# Patient Record
Sex: FEMALE | Race: OTHER | HISPANIC OR LATINO | URBAN - METROPOLITAN AREA
[De-identification: names, ages, dates, MRNs, and addresses within clinical notes are randomized per-mention and may not be internally consistent; named-entity substitution may affect disease eponyms.]

---

## 2018-08-20 ENCOUNTER — INPATIENT (INPATIENT)
Facility: HOSPITAL | Age: 63
LOS: 0 days | Discharge: LEFT BEFORE DISCHARGE | End: 2018-08-21
Attending: INTERNAL MEDICINE | Admitting: INTERNAL MEDICINE
Payer: COMMERCIAL

## 2018-08-20 VITALS
OXYGEN SATURATION: 99 % | TEMPERATURE: 98 F | WEIGHT: 100.09 LBS | SYSTOLIC BLOOD PRESSURE: 129 MMHG | HEART RATE: 70 BPM | RESPIRATION RATE: 20 BRPM | DIASTOLIC BLOOD PRESSURE: 81 MMHG

## 2018-08-20 DIAGNOSIS — I63.9 CEREBRAL INFARCTION, UNSPECIFIED: ICD-10-CM

## 2018-08-20 LAB
ALBUMIN SERPL ELPH-MCNC: 3.7 G/DL — SIGNIFICANT CHANGE UP (ref 3.3–5)
ALP SERPL-CCNC: 61 U/L — SIGNIFICANT CHANGE UP (ref 40–120)
ALT FLD-CCNC: 15 U/L — SIGNIFICANT CHANGE UP (ref 12–78)
AMPHET UR-MCNC: NEGATIVE — SIGNIFICANT CHANGE UP
ANION GAP SERPL CALC-SCNC: 10 MMOL/L — SIGNIFICANT CHANGE UP (ref 5–17)
APPEARANCE UR: CLEAR — SIGNIFICANT CHANGE UP
APTT BLD: 32.7 SEC — SIGNIFICANT CHANGE UP (ref 27.5–37.4)
AST SERPL-CCNC: 12 U/L — LOW (ref 15–37)
BARBITURATES UR SCN-MCNC: NEGATIVE — SIGNIFICANT CHANGE UP
BASOPHILS # BLD AUTO: 0.07 K/UL — SIGNIFICANT CHANGE UP (ref 0–0.2)
BASOPHILS NFR BLD AUTO: 0.7 % — SIGNIFICANT CHANGE UP (ref 0–2)
BENZODIAZ UR-MCNC: NEGATIVE — SIGNIFICANT CHANGE UP
BILIRUB SERPL-MCNC: 0.2 MG/DL — SIGNIFICANT CHANGE UP (ref 0.2–1.2)
BILIRUB UR-MCNC: NEGATIVE — SIGNIFICANT CHANGE UP
BUN SERPL-MCNC: 10 MG/DL — SIGNIFICANT CHANGE UP (ref 7–23)
CALCIUM SERPL-MCNC: 9.1 MG/DL — SIGNIFICANT CHANGE UP (ref 8.5–10.1)
CHLORIDE SERPL-SCNC: 111 MMOL/L — HIGH (ref 96–108)
CK MB CFR SERPL CALC: <1 NG/ML — SIGNIFICANT CHANGE UP (ref 0.5–3.6)
CO2 SERPL-SCNC: 23 MMOL/L — SIGNIFICANT CHANGE UP (ref 22–31)
COCAINE METAB.OTHER UR-MCNC: NEGATIVE — SIGNIFICANT CHANGE UP
COLOR SPEC: YELLOW — SIGNIFICANT CHANGE UP
CREAT SERPL-MCNC: 0.76 MG/DL — SIGNIFICANT CHANGE UP (ref 0.5–1.3)
DIFF PNL FLD: ABNORMAL
EOSINOPHIL # BLD AUTO: 0.19 K/UL — SIGNIFICANT CHANGE UP (ref 0–0.5)
EOSINOPHIL NFR BLD AUTO: 2 % — SIGNIFICANT CHANGE UP (ref 0–6)
EPI CELLS # UR: SIGNIFICANT CHANGE UP
ETHANOL SERPL-MCNC: <10 MG/DL — SIGNIFICANT CHANGE UP (ref 0–10)
GLUCOSE BLDC GLUCOMTR-MCNC: 136 MG/DL — HIGH (ref 70–99)
GLUCOSE SERPL-MCNC: 111 MG/DL — HIGH (ref 70–99)
GLUCOSE UR QL: NEGATIVE MG/DL — SIGNIFICANT CHANGE UP
HCT VFR BLD CALC: 39.9 % — SIGNIFICANT CHANGE UP (ref 34.5–45)
HGB BLD-MCNC: 12.9 G/DL — SIGNIFICANT CHANGE UP (ref 11.5–15.5)
IMM GRANULOCYTES NFR BLD AUTO: 0.3 % — SIGNIFICANT CHANGE UP (ref 0–1.5)
INR BLD: 0.96 RATIO — SIGNIFICANT CHANGE UP (ref 0.88–1.16)
KETONES UR-MCNC: NEGATIVE — SIGNIFICANT CHANGE UP
LEUKOCYTE ESTERASE UR-ACNC: NEGATIVE — SIGNIFICANT CHANGE UP
LYMPHOCYTES # BLD AUTO: 3.21 K/UL — SIGNIFICANT CHANGE UP (ref 1–3.3)
LYMPHOCYTES # BLD AUTO: 34.1 % — SIGNIFICANT CHANGE UP (ref 13–44)
MAGNESIUM SERPL-MCNC: 2.1 MG/DL — SIGNIFICANT CHANGE UP (ref 1.6–2.6)
MCHC RBC-ENTMCNC: 27.3 PG — SIGNIFICANT CHANGE UP (ref 27–34)
MCHC RBC-ENTMCNC: 32.3 GM/DL — SIGNIFICANT CHANGE UP (ref 32–36)
MCV RBC AUTO: 84.4 FL — SIGNIFICANT CHANGE UP (ref 80–100)
METHADONE UR-MCNC: NEGATIVE — SIGNIFICANT CHANGE UP
MONOCYTES # BLD AUTO: 0.51 K/UL — SIGNIFICANT CHANGE UP (ref 0–0.9)
MONOCYTES NFR BLD AUTO: 5.4 % — SIGNIFICANT CHANGE UP (ref 2–14)
NEUTROPHILS # BLD AUTO: 5.41 K/UL — SIGNIFICANT CHANGE UP (ref 1.8–7.4)
NEUTROPHILS NFR BLD AUTO: 57.5 % — SIGNIFICANT CHANGE UP (ref 43–77)
NITRITE UR-MCNC: NEGATIVE — SIGNIFICANT CHANGE UP
NRBC # BLD: 0 /100 WBCS — SIGNIFICANT CHANGE UP (ref 0–0)
OPIATES UR-MCNC: NEGATIVE — SIGNIFICANT CHANGE UP
PCP SPEC-MCNC: SIGNIFICANT CHANGE UP
PCP UR-MCNC: NEGATIVE — SIGNIFICANT CHANGE UP
PH UR: 5 — SIGNIFICANT CHANGE UP (ref 5–8)
PLATELET # BLD AUTO: 271 K/UL — SIGNIFICANT CHANGE UP (ref 150–400)
POTASSIUM SERPL-MCNC: 3.9 MMOL/L — SIGNIFICANT CHANGE UP (ref 3.5–5.3)
POTASSIUM SERPL-SCNC: 3.9 MMOL/L — SIGNIFICANT CHANGE UP (ref 3.5–5.3)
PROT SERPL-MCNC: 7 GM/DL — SIGNIFICANT CHANGE UP (ref 6–8.3)
PROT UR-MCNC: NEGATIVE MG/DL — SIGNIFICANT CHANGE UP
PROTHROM AB SERPL-ACNC: 10.5 SEC — SIGNIFICANT CHANGE UP (ref 9.8–12.7)
RBC # BLD: 4.73 M/UL — SIGNIFICANT CHANGE UP (ref 3.8–5.2)
RBC # FLD: 13.6 % — SIGNIFICANT CHANGE UP (ref 10.3–14.5)
RBC CASTS # UR COMP ASSIST: SIGNIFICANT CHANGE UP /HPF (ref 0–4)
SODIUM SERPL-SCNC: 144 MMOL/L — SIGNIFICANT CHANGE UP (ref 135–145)
SP GR SPEC: 1.01 — SIGNIFICANT CHANGE UP (ref 1.01–1.02)
THC UR QL: NEGATIVE — SIGNIFICANT CHANGE UP
TROPONIN I SERPL-MCNC: <.015 NG/ML — SIGNIFICANT CHANGE UP (ref 0.01–0.04)
UROBILINOGEN FLD QL: NEGATIVE MG/DL — SIGNIFICANT CHANGE UP
WBC # BLD: 9.42 K/UL — SIGNIFICANT CHANGE UP (ref 3.8–10.5)
WBC # FLD AUTO: 9.42 K/UL — SIGNIFICANT CHANGE UP (ref 3.8–10.5)

## 2018-08-20 PROCEDURE — 99223 1ST HOSP IP/OBS HIGH 75: CPT

## 2018-08-20 PROCEDURE — 70450 CT HEAD/BRAIN W/O DYE: CPT | Mod: 26,77

## 2018-08-20 PROCEDURE — 71045 X-RAY EXAM CHEST 1 VIEW: CPT | Mod: 26

## 2018-08-20 PROCEDURE — 70450 CT HEAD/BRAIN W/O DYE: CPT | Mod: 26,59

## 2018-08-20 PROCEDURE — 99291 CRITICAL CARE FIRST HOUR: CPT

## 2018-08-20 PROCEDURE — 70496 CT ANGIOGRAPHY HEAD: CPT | Mod: 26

## 2018-08-20 RX ORDER — LEVETIRACETAM 250 MG/1
1000 TABLET, FILM COATED ORAL ONCE
Qty: 0 | Refills: 0 | Status: COMPLETED | OUTPATIENT
Start: 2018-08-20 | End: 2018-08-20

## 2018-08-20 RX ORDER — SODIUM CHLORIDE 9 MG/ML
1000 INJECTION, SOLUTION INTRAVENOUS
Qty: 0 | Refills: 0 | Status: DISCONTINUED | OUTPATIENT
Start: 2018-08-20 | End: 2018-08-21

## 2018-08-20 RX ORDER — LEVETIRACETAM 250 MG/1
1500 TABLET, FILM COATED ORAL ONCE
Qty: 0 | Refills: 0 | Status: DISCONTINUED | OUTPATIENT
Start: 2018-08-20 | End: 2018-08-20

## 2018-08-20 RX ORDER — ACETAMINOPHEN 500 MG
650 TABLET ORAL EVERY 6 HOURS
Qty: 0 | Refills: 0 | Status: DISCONTINUED | OUTPATIENT
Start: 2018-08-20 | End: 2018-08-21

## 2018-08-20 RX ORDER — ALTEPLASE 100 MG
4.1 KIT INTRAVENOUS ONCE
Qty: 0 | Refills: 0 | Status: COMPLETED | OUTPATIENT
Start: 2018-08-20 | End: 2018-08-20

## 2018-08-20 RX ORDER — ALTEPLASE 100 MG
37 KIT INTRAVENOUS ONCE
Qty: 0 | Refills: 0 | Status: COMPLETED | OUTPATIENT
Start: 2018-08-20 | End: 2018-08-20

## 2018-08-20 RX ORDER — SODIUM CHLORIDE 9 MG/ML
1000 INJECTION, SOLUTION INTRAVENOUS ONCE
Qty: 0 | Refills: 0 | Status: COMPLETED | OUTPATIENT
Start: 2018-08-20 | End: 2018-08-20

## 2018-08-20 RX ADMIN — LEVETIRACETAM 400 MILLIGRAM(S): 250 TABLET, FILM COATED ORAL at 19:19

## 2018-08-20 RX ADMIN — Medication 1 MILLIGRAM(S): at 17:02

## 2018-08-20 RX ADMIN — ALTEPLASE 37 MILLIGRAM(S): KIT at 12:02

## 2018-08-20 RX ADMIN — Medication 650 MILLIGRAM(S): at 22:24

## 2018-08-20 RX ADMIN — SODIUM CHLORIDE 75 MILLILITER(S): 9 INJECTION, SOLUTION INTRAVENOUS at 19:44

## 2018-08-20 RX ADMIN — ALTEPLASE 246 MILLIGRAM(S): KIT at 12:00

## 2018-08-20 RX ADMIN — Medication 650 MILLIGRAM(S): at 21:27

## 2018-08-20 NOTE — H&P ADULT - NSHPLABSRESULTS_GEN_ALL_CORE
Lab Results:  CBC  CBC Full  -  ( 20 Aug 2018 12:19 )  WBC Count : 9.42 K/uL  Hemoglobin : 12.9 g/dL  Hematocrit : 39.9 %  Platelet Count - Automated : 271 K/uL  Mean Cell Volume : 84.4 fl  Mean Cell Hemoglobin : 27.3 pg  Mean Cell Hemoglobin Concentration : 32.3 gm/dL  Auto Neutrophil # : 5.41 K/uL  Auto Lymphocyte # : 3.21 K/uL  Auto Monocyte # : 0.51 K/uL  Auto Eosinophil # : 0.19 K/uL  Auto Basophil # : 0.07 K/uL  Auto Neutrophil % : 57.5 %  Auto Lymphocyte % : 34.1 %  Auto Monocyte % : 5.4 %  Auto Eosinophil % : 2.0 %  Auto Basophil % : 0.7 %    .		Differential:	[] Automated		[] Manual  Chemistry                        12.9   9.42  )-----------( 271      ( 20 Aug 2018 12:19 )             39.9     08-20    144  |  111<H>  |  10  ----------------------------<  111<H>  3.9   |  23  |  0.76    Ca    9.1      20 Aug 2018 12:19  Mg     2.1     08-20    TPro  7.0  /  Alb  3.7  /  TBili  0.2  /  DBili  x   /  AST  12<L>  /  ALT  15  /  AlkPhos  61  08-20    LIVER FUNCTIONS - ( 20 Aug 2018 12:19 )  Alb: 3.7 g/dL / Pro: 7.0 gm/dL / ALK PHOS: 61 U/L / ALT: 15 U/L / AST: 12 U/L / GGT: x           PT/INR - ( 20 Aug 2018 12:19 )   PT: 10.5 sec;   INR: 0.96 ratio         PTT - ( 20 Aug 2018 12:19 )  PTT:32.7 sec  Urinalysis Basic - ( 20 Aug 2018 15:56 )    Color: Yellow / Appearance: Clear / S.010 / pH: x  Gluc: x / Ketone: Negative  / Bili: Negative / Urobili: Negative mg/dL   Blood: x / Protein: Negative mg/dL / Nitrite: Negative   Leuk Esterase: Negative / RBC: 0-2 /HPF / WBC x   Sq Epi: x / Non Sq Epi: Occasional / Bacteria: x            MICROBIOLOGY/CULTURES:      RADIOLOGY RESULTS:      < from: CT Head No Cont (18 @ 16:50) >      Unremarkable study without intracranial hemorrhage, mass effect, or CT   evidence of acute territorial infarct.    < end of copied text >        < from: CT Angio Head w/ IV Cont (18 @ 12:59) >    1. Widely patent intracerebral vasculature without evidence of a proximal   stenosis orocclusion. No aneurysm identified.  2. There is segmental narrowing and tapering of multiple distal vessels   with irregularity raising the possibility of vasculitis and/or   angiopathy.. Further workup and assessment recommended.      < end of copied text >

## 2018-08-20 NOTE — H&P ADULT - NSHPPHYSICALEXAM_GEN_ALL_CORE
PHYSICAL EXAM:    GENERAL: NAD, well-groomed, well-developed  HEAD:  Atraumatic, Normocephalic  EYES: conjunctiva and sclera clear  NECK: Supple,   NERVOUS SYSTEM:  Alert & Oriented X3, Good concentration;  unable to move left side. able to wiggle toes minimally and barely moving fingers   CHEST/LUNG: Clear to percussion bilaterally;   HEART: Regular rate and rhythm;  ABDOMEN: Soft, Nontender, Nondistended;  EXTREMITIES:  No clubbing, cyanosis, or edema

## 2018-08-20 NOTE — ED PROVIDER NOTE - PHYSICAL EXAMINATION
Gen: Alert, anxious   Head: NC, AT   Eyes: PERRL, EOMI, normal lids/conjunctiva  ENT: normal hearing, patent oropharynx without erythema/exudate, uvula midline  Neck: supple, no tenderness, Trachea midline  Pulm: Bilateral BS, normal resp effort, no wheeze/stridor/retractions  CV: RRR, no M/R/G, 2+ radial and dp pulses bl, no edema  Abd: soft, NT/ND, +BS, no hepatosplenomegaly  Mskel: extremities x4 with normal ROM and no joint effusions. no ctl spine ttp.   Skin: no rash, no bruising   Neuro: AAOx3, mild sensory deficits left upper and lower ext with hemiparesis on that side. right sided wnl. left sided facial droop with slight decrease of left facial sensation. no coordination deficits or dwayne neglect

## 2018-08-20 NOTE — ED ADULT NURSE NOTE - CHIEF COMPLAINT QUOTE
pt complaining of left sided weakness, numbness and tingling as well as slurred speech starting 40 minutes ago. fs 136 at triage. code stroke called.

## 2018-08-20 NOTE — ED PROVIDER NOTE - OBJECTIVE STATEMENT
Pertinent PMH/PSH/FHx/SHx and Review of Systems contained within:  63F former smoker on nicotine pw weakness. patient was at work when approx 10am (her boss came to her) she noted she was having slurred speech and difficulty moving her left side. it started in her left upper ext but then spread to the left lower ext and face. no cp, sob, nausea, vomiting, ha, vision change, bleeding, rash, dysuria or rhinorrhea. patient did not take anything for her symptoms but called 911  Fh and Sh not otherwise contributory  ROS otherwise negative

## 2018-08-20 NOTE — H&P ADULT - PROBLEM SELECTOR PLAN 1
close monitoring in icu for 24 hours post tpa to monitor for bleeding.  neuro checks every 1 hour. PT/OT/SLP evals, check lipid and  hgb a1c in AM. carotid dopplers and echo ordered. Neuro consult pending. ASA and heparin sc when appropriate.  will start statin when able to take PO.  SCD for dvt prophylaxis.

## 2018-08-20 NOTE — H&P ADULT - ASSESSMENT
62 yo female with no PMH except being an ex-smoker on nicotine patch admitted for acute left sided weakness found to have a cva s/p tpa

## 2018-08-20 NOTE — ED ADULT NURSE NOTE - NS TRANSFER PATIENT BELONGINGS
Jewelry/Money (specify)/Cell Phone/PDA (specify)/Clothing/1 gold necklace with cross pendant. 1 pair of libia earrings and I plastic bracelet.

## 2018-08-20 NOTE — ED PROVIDER NOTE - MEDICAL DECISION MAKING DETAILS
patient pw stroke. given onset within 3 hours and clear measurable deficits with NIH 9, have given TPA quickly. [place] [facility] [place] [place]. Will do CTA. Will need ICU admit. As interpreted by ED physician, ECG is NSR with normal intervals/axis, no changes in QRS, no ST/T changes.

## 2018-08-20 NOTE — ED ADULT NURSE NOTE - OBJECTIVE STATEMENT
pt states " around 10 am on the job this morning and I felt the left side of my arm, left leg get numb and my foot fell asleep with pins and needle."

## 2018-08-20 NOTE — H&P ADULT - HISTORY OF PRESENT ILLNESS
62 yo female with no PMH except being an ex-smoker on nicotine patch admitted for acute left sided weakness that started this morning while at work.  Pt states she just didn't feel well and then started experiencing left sided upper and lower extremity tingling and numbness.  Pt also states having some dizziness with nausea and then just had a bad feeling.  Pt told co-workers and EMS called and brought pt in 62 yo female with no PMH except being an ex-smoker on nicotine patch admitted for acute left sided weakness that started this morning while at work.  Pt states she just didn't feel well and then started experiencing left sided upper and lower extremity tingling and numbness.  Pt also states having some dizziness with nausea and then just had a bad feeling.  Pt told co-workers and EMS called and brought pt in to ER and CT  head negative.  Decision was made for TPA.  ICU called for evaluation and admitted to our service.  While awaiting bed, pt had mild seizure activity with facial twitching.  Pt given ativan 1mg and keppra load of 1500mg.  Repeat head CT negative for acute pathology

## 2018-08-20 NOTE — CONSULT NOTE ADULT - ASSESSMENT
Subjective Complaints:  Historian:       Consult requested by ER doctor:                  Attending:     HPI:  62 yo female with no PMH except being an ex-smoker on nicotine patch admitted for acute left sided weakness that started this morning while at work.  Pt states she just didn't feel well and then started experiencing left sided upper and lower extremity tingling and numbness.  Pt also states having some dizziness with nausea and then just had a bad feeling.  Pt told co-workers and EMS called and brought pt in to ER and CT  head negative.  Decision was made for TPA.  ICU called for evaluation and admitted to our service.  While awaiting bed, pt had mild seizure activity with facial twitching.  Pt given ativan 1mg and keppra load of 1500mg.  Repeat head CT negative for acute pathology (20 Aug 2018 18:31)    RE LOYA    PAST MEDICAL & SURGICAL HISTORY:  No pertinent past medical history  63yFemale    MEDICATIONS  (STANDING):  lactated ringers. 1000 milliLiter(s) (75 mL/Hr) IV Continuous <Continuous>    MEDICATIONS  (PRN):  acetaminophen   Tablet. 650 milliGRAM(s) Oral every 6 hours PRN Moderate Pain (4 - 6)      Allergies    No Known Allergies    Intolerances      FAMILY HISTORY:    Vital Signs Last 24 Hrs  T(C): 36.1 (20 Aug 2018 19:08), Max: 36.8 (20 Aug 2018 15:59)  T(F): 97 (20 Aug 2018 19:08), Max: 98.2 (20 Aug 2018 15:59)  HR: 67 (20 Aug 2018 21:00) (63 - 83)  BP: 115/68 (20 Aug 2018 21:00) (94/58 - 129/81)  BP(mean): 82 (20 Aug 2018 21:00) (69 - 84)  RR: 14 (20 Aug 2018 21:00) (8 - 118)  SpO2: 100% (20 Aug 2018 21:00) (97% - 100%)    NEUROLOGICAL EXAM:  HENT:  Normocephalic head; atraumatic head.  Neck supple.  ENT: normal looking.  Mental State:    Alert.  Fully oriented to person, place and date.  Coherent.  Speech clear and intact.  Cooperative.  Responds appropriately.    Cranial Nerves:  II-XII:   Pupils round and reactive to light and accommodation.  Extraocular movements full.  Visual fields full (no homonymous hemianopsia).  Visual acuity wnl.  Facial symmetry intact.  Tongue midline.  Motor Functions:  Moves all extremities.  No pronator drift of UE.  Claps hand well.  Hand  intact bilaterally.  Ambulatory.    Sensory Functions:   Intact to touch and pinprick to face and extremities.    Reflexes:  Deep tendon reflexes normoactive to biceps, knees and ankles.  Babinski absent (present).  Cerebellar Testing:    Finger to nose intact.  Nystagmus absent.  Neurovascular: Carotid auscultation full without bruits.      LABS:                        12.9   9.42  )-----------( 271      ( 20 Aug 2018 12:19 )             39.9         144  |  111<H>  |  10  ----------------------------<  111<H>  3.9   |  23  |  0.76    Ca    9.1      20 Aug 2018 12:19  Mg     2.1         TPro  7.0  /  Alb  3.7  /  TBili  0.2  /  DBili  x   /  AST  12<L>  /  ALT  15  /  AlkPhos  61      PT/INR - ( 20 Aug 2018 12:19 )   PT: 10.5 sec;   INR: 0.96 ratio         PTT - ( 20 Aug 2018 12:19 )  PTT:32.7 sec    Urinalysis Basic - ( 20 Aug 2018 15:56 )    Color: Yellow / Appearance: Clear / S.010 / pH: x  Gluc: x / Ketone: Negative  / Bili: Negative / Urobili: Negative mg/dL   Blood: x / Protein: Negative mg/dL / Nitrite: Negative   Leuk Esterase: Negative / RBC: 0-2 /HPF / WBC x   Sq Epi: x / Non Sq Epi: Occasional / Bacteria: x        RADIOLOGY & ADDITIONAL STUDIES:    CT Brain Stroke Protocol: Urgent   Indication: left side weakness  Transport: [facility]  Exam Completed  Provider's Contact #: [phone] ( @ 11:26)  POCT  Blood Glucose: 11:25 ( @ 11:27)  Complete Blood Count + Automated Diff: STAT ( @ 11:27)  Comprehensive Metabolic Panel: STAT ( @ 11:27)  Type + Screen: STAT  Cancel Reason: Hemolyzed Redraw ( @ 11:27)  ABO Rh Confirmatory Specimen: STAT  Addl Info: Conditional: ABO Rh Confirmatory Specimen ()  CKMB Mass Assay: STAT ()  Troponin I, Serum: STAT ()  Magnesium, Serum: STAT ()  Alcohol, Blood: STAT ()  Culture - Urine: Routine  Specimen Source: Clean Catch (Midstream) ()  Drug Screen W/PCP, Urine: STAT ()  Urinalysis: STAT ()  Prothrombin Time and INR, Plasma:  Start Date:20-Aug-2018. STAT ()  Activated Partial Thromboplastin Time:  Start Date:20-Aug-2018. STAT ()  12 Lead ECG:   Provider's Contact #: [phone] (:)  Xray Chest 1 View AP/PA.: Urgent   Indication: stroke  Transport: [facility]  Exam Completed  Provider's Contact #: [phone] (:)  National Institutes of Health Stroke Scale Score:     Time/Priority:  Routine (:)  Vital Signs:     Frequency:  See Frequency Below    Every: 15 minute(s)   For: 2 hour(s)    Every: 30 minute(s)   For: 6 hour(s)    Every: 1 hour(s)   For: 16 hour(s) then every 2 hr (:)  Assess Neurological Status-Post Procedure:     Frequency:  See Frequency Below    Every: 15 minute(s)   For: 2 hour(s)    Every: 30 minute(s)   For: 6 hour(s)    Every: 1 hour(s)   For: 16 hour(s) then every 2 hr    Additional Instructions:  After start of intravenous tissue plasminogen activator (tPA) ()  Monitor For:     Additional Instructions:  Hematoma formation when using automatic blood pressure monitoring and pneumatic compression stockings. ()  Assess Bleeding ()  Dysphagia Screening:     Time/Priority:  Routine ()  Notify Provider For:     Additional Instructions:  Systolic blood pressure GREATER THAN 180 mmHg ()  Notify Provider For:     Additional Instructions:  Diastolic blood pressure GREATER THAN 105 mmHg (:)  Notify Provider For:     Additional Instructions:  Neurological decline or if patient complains of headache and STOP tissue plasminogen activator (tPA) Infusion ()  Activity - Bedrest:     Duration:  24 Hours    Additional Instructions:  Bedrest for first 24 hours after intravenous tissue plasminogen activator (tPA) (:)  Activity - Out of Bed with Assistance:     Additional Instructions:  Start 24 hours post tissue plasminogen activator (tPA) bolus (:)  NO Blood Draw: ;  Duration:  4 Hours    Additional Instructions:  After intravenous tissue plasminogen activator (tPA) infusion (:)  NO Lines: ;  Duration:  24 Hours    Additional Instructions:  No central venous or arterial lines during the first 24 hours after intravenous tissue plasminogen activator (tPA) infusion ()  Cardiac Monitor INCLUDING Off Unit Tests:     Time/Priority:  Routine (:)  Pulse Oximetry:   Frequency: <Continuous> (:)  NO Urethral Catheter: ;  Duration:  3 Hours    Additional Instructions:  During intravenous tissue plasminogen activator (tPA) infusion and for at least 2 hours after the infusion. (:)  NO Nasogastric Tube: ;  Duration:  24 Hours    Additional Instructions:  After intravenous tissue plasminogen activator (tPA) infusion. ()  Diet, NPO:      Special Instructions for Nursing:  For the first 12 hours post tissue plasminogen activator (tPA) bolus (:)  Provider to RN:       No antiplatelet or anticoagulant medications for 24 hours AFTER tissue plasminogen activator (tPA) (:)  alteplase    Bolus: [Known as ACTIVASE Bolus]  4.1 milliGRAM(s) in sterile water 4.1 milliLiter(s), IV Bolus, once, infuse over 1 Minute(s), Stop After 1 Doses  Special Instructions: Total MAX dose 90 milliGRAM(s)  Provider's Contact #: [phone]     (Calc Info: 0.09 milliGRAM(s)/Kg/DOSE x 45.4 Kg = 4.1 milliGRAM(s)/Dose     (Requested dose was 0.09 milliGRAM(s) per Kg) (:29)  alteplase    IVPB: [Known as ACTIVASE IVPB]  37 milliGRAM(s) in sterile water 37 milliLiter(s), IV Intermittent, once, infuse over 60 Minute(s), Stop After 1 Doses  Special Instructions: Total MAX dose 90 milliGRAM(s)  Provider's Contact #: [phone]     (Calc Info: 0.81 milliGRAM(s)/Kg/DOSE x 45.4 Kg = 37 milliGRAM(s)/Dose     (Requested dose was 0.81 milliGRAM(s) per Kg) ( @ 11:29)  (Floorstock):   Qty Removed: 1 each ( @ 11:32)  CT Angio Head w/ IV Cont: Urgent   Indication: cva, left side weak  Transport: [facility]  Exam Completed  Provider's Contact #: [phone] ( @ 11:56)  Nucleated RBC: 11:50 ( @ 12:19)  Antibody Screen: 11:50  Cancel Reason: Hemolyzed Redraw ( @ 12:19)  Admit to Inpatient Level of Care:     Service:  INTENSIVE CARE UNIT    Physician:  Braeden William    Additional Instructions:  Diagnosis: Ischemic stroke  Isolation: None  Special Consideration: None ( @ 13:28)  Admit from ED ( @ 14:15)  Admit to Inpatient Level of Care:     Service:  icu    Physician:  Dr. Brooks ( @ 14:47)  Provider to RN:       Sedation awakening trial as per ABCDEF Guidelines ( 14:47)  Assess For:     Additional Instructions:  CAM ICU Assessment.  As per ICU policy or at a minimum every shift ( @ 14:47)  Height/Length:     Frequency:  on admission ( @ 14:47)  Weight:     Frequency:  daily ( @ :47)  Vital Signs:     Frequency:  every 1 hour    Additional Instructions:  Assess BP, HR, RR, Temp. and SpO2 (:47)  Assess Pain:     Frequency:  every 4 hours    Additional Instructions:  As per ABCDEF guideline (:47)  Assess Neurological Status:     Type of Neuro Check:  General    Frequency:  every 4 hours ( @ 14:47)  Intake and Output - Strict:     Frequency:  every 1 hour (08-20 @ 14:47)  Notify Provider For:     Additional Instructions:  Decline or change in neurological status ( 14:47)  Activity - Increase As Tolerated:     Time/Priority:  Routine ( 14:47)  VTE Prophylaxis - No Pharmacological Prophylaxis Due To::     Time/Priority:  Routine    Reason For No Pharmacologic VTE Prophylaxis  Bleeding Risk    Additional Instructions:  tpa ( @ 14:47)  Benzodiazepine, Urine: 15:50 ( @ 15:56)  THC, Urine Qualitative: 15:50 ( @ 15:56)  Cocaine Metabolite, Urine: 15:50 (20 @ 15:56)  Amphetamine, Urine: 15:50 ( @ 15:56)  Opiate, Urine: 15:50 ( @ 15:56)  Barbiturates Screen, Urine: 15:50 ( @ 15:56)  Phencyclidine Level, Urine: 15:50 ( @ 15:56)  Methadone, Urine: 15:50 ( @ 15:56)  Urine Microscopic-Add On (NC): 15:50 ( @ 15:59)  CT Head No Cont: Routine   Indication: seizure  Transport: [facility]  Exam Completed  Provider's Contact #: [phone] ( @ 16:22)  LORazepam   Injectable: [Ordered as ATIVAN Injectable]  1 milliGRAM(s), IV Push, once, Stop After 1 Doses  Administration Instructions: This is a Look-alike/Sound-alike Medication  Provider's Contact #: [phone] ( @ 16:22)  levETIRAcetam  IVPB: [Ordered as KEPPRA IVPB]  1500 milliGRAM(s) in IV Solution 100 milliLiter(s), IV Intermittent, once, infuse over 15 Minute(s), Stop After 1 Doses  Administration Instructions: This is a Look-alike/Sound-alike Medication  Provider's Contact #: [phone] ( 16:22)  (ADM OVERRIDE):   Qty Removed: 1 each  Route - Dose Given <see task> ( @ 16:23)  (ADM OVERRIDE):   Qty Removed: 1 each  Route - Dose Given <see task> ( @ 16:27)  lactated ringers Bolus:   1000 milliLiter(s), IV Bolus, once, infuse over 30 Minute(s), Stop After 1 Doses  Provider's Contact #: [phone] ( @ 17:07)  TTE Echo Doppler w/o Cont:   Transport: Portable  Monitor: w/ Monitor  Provider's Contact #: [phone] ( @ 18:02)  US Duplex Carotid Arteries Complete, Bilateral: Routine   Indication: stenosis  Transport: [facility]  Provider's Contact #: [phone] ( @ 18:02)  Lipid Profile: AM Sched. Collection: 21-Aug-2018 04:00 ( @ 18:02)  Hemoglobin A1C, Whole Blood: AM Sched. Collection: 21-Aug-2018 04:00 ( @ 18:02)  Consult- PT Evaluate and Treat:   *Reason for Consult - Must select at least one choice*     Neuro Event  Weight Bearing Restrictions: No ( @ 18:02)  Consult- OT Evaluate and Treat:   *Reason for Consult - Must select at least one choice*     ADL  Weight Bearing Restrictions: No ( @ 18:02)  Consult- Speech Bedside Swallow Evaluation:   *Reason for Consult - Must select at least one choice*     NPO Until Further Recommendations Made ( @ 18:02)  Assess Neurological Status:     Type of Neuro Check:  Stroke    Frequency:  every 1 hour ( @ 18:02)  MR Head No Cont: Routine   Indication: cva  Transport: [facility],  w/ Monitor  Provider's Contact #: [phone] ( @ 18:04)  Complete Blood Count: AM Sched. Collection: 21-Aug-2018 04:00 ( @ 18:07)  Comprehensive Metabolic Panel: AM Sched. Collection: 21-Aug-2018 04:00 ( @ 18:07)  Magnesium, Serum: AM Sched. Collection: 21-Aug-2018 04:00 ( @ 18:07)  Phosphorus Level, Serum: AM Sched. Collection: 21-Aug-2018 04:00 (08 @ 18:07)  Compression Device Sequential:     Body Side:  Bilateral ( @ 18:08)  levETIRAcetam  IVPB: [Ordered as KEPPRA IVPB]  1000 milliGRAM(s) in IV Solution 100 milliLiter(s), IV Intermittent, once, infuse over 15 Minute(s), Stop After 1 Doses  Administration Instructions: This is a Look-alike/Sound-alike Medication  Provider's Contact #: [phone] ( @ 18:11)  lactated ringers.: Solution, 1000 milliLiter(s) infuse at 75 mL/Hr  Provider's Contact #: [phone] ( @ 19:34)  acetaminophen   Tablet.: [Known as TYLENOL.]  650 milliGRAM(s), Oral, every 6 hours, PRN for Moderate Pain (4 - 6)  Administration Instructions: MAX DAILY DOSE:  ADULT = 4,000 mG/Day ( @ 21:19)      Assessment & Opinion:    Recommendations:  Brain MRI.  Carotid doppler.  Echocardiogram.  EEG.   DVT prophylaxis as ordered.  Medications: Subjective Complaints:  Historian: Patient. ER notes reviewed.  Brain CT unremarkable.   HPI:    RE LOYA.  62 yo female with no PMH except being an ex-smoker on nicotine patch admitted for acute left sided weakness that started this morning while at work.  Pt states she just didn't feel well and then started experiencing left sided upper and lower extremity tingling and numbness.  Pt also states having some dizziness with nausea and then just had a bad feeling.  Pt told co-workers and EMS called and brought pt in to ER and CT  head negative.  Decision was made for TPA.  ICU called for evaluation and admitted to our service.  While awaiting bed, pt had mild seizure activity with facial twitching.  Pt given ativan 1mg and keppra load of 1500mg.  Repeat head CT negative for acute pathology (20 Aug 2018 18:31)    PAST MEDICAL & SURGICAL HISTORY:  No pertinent past medical history    MEDICATIONS  (STANDING):  lactated ringers. 1000 milliLiter(s) (75 mL/Hr) IV Continuous <Continuous>    MEDICATIONS  (PRN):  acetaminophen   Tablet. 650 milliGRAM(s) Oral every 6 hours PRN Moderate Pain (4 - 6)  Allergies: No Known Allergies  Intolerances  FAMILY HISTORY:    Vital Signs Last 24 Hrs  T(C): 36.1 (20 Aug 2018 19:08), Max: 36.8 (20 Aug 2018 15:59)  T(F): 97 (20 Aug 2018 19:08), Max: 98.2 (20 Aug 2018 15:59)  HR: 67 (20 Aug 2018 21:00) (63 - 83)  BP: 115/68 (20 Aug 2018 21:00) (94/58 - 129/81)  BP(mean): 82 (20 Aug 2018 21:00) (69 - 84)  RR: 14 (20 Aug 2018 21:00) (8 - 118)  SpO2: 100% (20 Aug 2018 21:00) (97% - 100%)    NEUROLOGICAL EXAM:  HENT:  Normocephalic head; atraumatic head.  Neck supple.  ENT: normal looking.  Mental State:    Alert.  Fully oriented to person, place and date.  Coherent.  Speech clear and intact.  Cooperative.  Responds appropriately.    Cranial Nerves:  II-XII:   Pupils round and reactive to light and accommodation.  Extraocular movements full.  Visual fields full (no homonymous hemianopsia).  Visual acuity wnl.  Facial symmetry intact.    Motor Functions:  Moves all extremities.  No pronator drift of UE.  Claps hands well.  Hand  intact bilaterally.      Sensory Functions:   Intact to touch and pinprick to face and extremities.    Reflexes:  Deep tendon reflexes normoactive to biceps, knees and ankles.    Cerebellar Testing:    Finger to nose intact.  Nystagmus absent.  Neurovascular: Carotid auscultation full without bruits.      LABS:                        12.9   9.42  )-----------( 271      ( 20 Aug 2018 12:19 )             39.9         144  |  111<H>  |  10  ----------------------------<  111<H>  3.9   |  23  |  0.76    Ca    9.1      20 Aug 2018 12:19  Mg     2.1         TPro  7.0  /  Alb  3.7  /  TBili  0.2  /  DBili  x   /  AST  12<L>  /  ALT  15  /  AlkPhos  61      PT/INR - ( 20 Aug 2018 12:19 )   PT: 10.5 sec;   INR: 0.96 ratio       PTT - ( 20 Aug 2018 12:19 )  PTT:32.7 sec  Urinalysis Basic - ( 20 Aug 2018 15:56 )    Color: Yellow / Appearance: Clear / S.010 / pH: x  Gluc: x / Ketone: Negative  / Bili: Negative / Urobili: Negative mg/dL   Blood: x / Protein: Negative mg/dL / Nitrite: Negative   Leuk Esterase: Negative / RBC: 0-2 /HPF / WBC x   Sq Epi: x / Non Sq Epi: Occasional / Bacteria: x    RADIOLOGY & ADDITIONAL STUDIES:    CT Brain Stroke Protocol: Urgent   Indication: left side weakness  Transport: [facility]  Exam Completed  Provider's Contact #: [phone] ( @ 11:26)  POCT  Blood Glucose: 11:25 ( @ 11:27)  Complete Blood Count + Automated Diff: STAT ( @ 11:27)  Comprehensive Metabolic Panel: STAT ( @ 11:27)  Type + Screen: STAT  Cancel Reason: Hemolyzed Redraw ( @ 11:27)  ABO Rh Confirmatory Specimen: STAT  Addl Info: Conditional: ABO Rh Confirmatory Specimen ( @ 11:27)  CKMB Mass Assay: STAT (:)  Troponin I, Serum: STAT ()  Magnesium, Serum: STAT ()  Alcohol, Blood: STAT ()  Culture - Urine: Routine  Specimen Source: Clean Catch (Midstream) (:)  Drug Screen W/PCP, Urine: STAT (:)  Urinalysis: STAT ()  Prothrombin Time and INR, Plasma:  Start Date:20-Aug-2018. STAT (:)  Activated Partial Thromboplastin Time:  Start Date:20-Aug-2018. STAT (:)  12 Lead ECG:   Provider's Contact #: [phone] ()  Xray Chest 1 View AP/PA.: Urgent   Indication: stroke  Transport: Stret[facility]-Crib  Exam Completed  Provider's Contact #: [phone] (:)  National Institutes of Health Stroke Scale Score:     Time/Priority:  Routine ()  Vital Signs:     Frequency:  See Frequency Below    Every: 15 minute(s)   For: 2 hour(s)    Every: 30 minute(s)   For: 6 hour(s)    Every: 1 hour(s)   For: 16 hour(s) then every 2 hr (:)  Assess Neurological Status-Post Procedure:     Frequency:  See Frequency Below    Every: 15 minute(s)   For: 2 hour(s)    Every: 30 minute(s)   For: 6 hour(s)    Every: 1 hour(s)   For: 16 hour(s) then every 2 hr    Additional Instructions:  After start of intravenous tissue plasminogen activator (tPA) ()  Monitor For:     Additional Instructions:  Hematoma formation when using automatic blood pressure monitoring and pneumatic compression stockings. ()  Assess Bleeding ()  Dysphagia Screening:     Time/Priority:  Routine ()  Notify Provider For:     Additional Instructions:  Systolic blood pressure GREATER THAN 180 mmHg (:)  Notify Provider For:     Additional Instructions:  Diastolic blood pressure GREATER THAN 105 mmHg ()  Notify Provider For:     Additional Instructions:  Neurological decline or if patient complains of headache and STOP tissue plasminogen activator (tPA) Infusion (:)  Activity - Bedrest:     Duration:  24 Hours    Additional Instructions:  Bedrest for first 24 hours after intravenous tissue plasminogen activator (tPA) (:)  Activity - Out of Bed with Assistance:     Additional Instructions:  Start 24 hours post tissue plasminogen activator (tPA) bolus (:)  NO Blood Draw: ;  Duration:  4 Hours    Additional Instructions:  After intravenous tissue plasminogen activator (tPA) infusion (:)  NO Lines: ;  Duration:  24 Hours    Additional Instructions:  No central venous or arterial lines during the first 24 hours after intravenous tissue plasminogen activator (tPA) infusion (:)  Cardiac Monitor INCLUDING Off Unit Tests:     Time/Priority:  Routine (:)  Pulse Oximetry:   Frequency: <Continuous> (:)  NO Urethral Catheter: ;  Duration:  3 Hours    Additional Instructions:  During intravenous tissue plasminogen activator (tPA) infusion and for at least 2 hours after the infusion. (:)  NO Nasogastric Tube: ;  Duration:  24 Hours    Additional Instructions:  After intravenous tissue plasminogen activator (tPA) infusion. (:)  Diet, NPO:      Special Instructions for Nursing:  For the first 12 hours post tissue plasminogen activator (tPA) bolus (:)  Provider to RN:       No antiplatelet or anticoagulant medications for 24 hours AFTER tissue plasminogen activator (tPA) (:29)  alteplase    Bolus: [Known as ACTIVASE Bolus]  4.1 milliGRAM(s) in sterile water 4.1 milliLiter(s), IV Bolus, once, infuse over 1 Minute(s), Stop After 1 Doses  Special Instructions: Total MAX dose 90 milliGRAM(s)  Provider's Contact #: [phone]     (Calc Info: 0.09 milliGRAM(s)/Kg/DOSE x 45.4 Kg = 4.1 milliGRAM(s)/Dose     (Requested dose was 0.09 milliGRAM(s) per Kg) ( 11:29)  alteplase    IVPB: [Known as ACTIVASE IVPB]  37 milliGRAM(s) in sterile water 37 milliLiter(s), IV Intermittent, once, infuse over 60 Minute(s), Stop After 1 Doses  Special Instructions: Total MAX dose 90 milliGRAM(s)  Provider's Contact #: [phone]     (Calc Info: 0.81 milliGRAM(s)/Kg/DOSE x 45.4 Kg = 37 milliGRAM(s)/Dose     (Requested dose was 0.81 milliGRAM(s) per Kg) ( @ 11:29)  (Floorstock):   Qty Removed: 1 each ( @ 11:32)  CT Angio Head w/ IV Cont: Urgent   Indication: cva, left side weak  Transport: Stretcher-Crib  Exam Completed  Provider's Contact #: [phone] ( @ 11:56)  Nucleated RBC: 11:50 ( @ 12:19)  Antibody Screen: 11:50  Cancel Reason: Hemolyzed Redraw ( @ 12:19)  Admit to Inpatient Level of Care:     Service:  INTENSIVE CARE UNIT    Physician:  Braeden William    Additional Instructions:  Diagnosis: Ischemic stroke  Isolation: None  Special Consideration: None ( @ 13:28)  Admit from ED ( @ 14:15)  Admit to Inpatient Level of Care:     Service:  icu    Physician:  Dr. Brooks ( @ 14:47)  Provider to RN:       Sedation awakening trial as per ABCDEF Guidelines ( 14:47)  Assess For:     Additional Instructions:  CAM ICU Assessment.  As per ICU policy or at a minimum every shift (:47)  Height/Length:     Frequency:  on admission ( @ 14:47)  Weight:     Frequency:  daily ( @ :47)  Vital Signs:     Frequency:  every 1 hour    Additional Instructions:  Assess BP, HR, RR, Temp. and SpO2 ( @ 14:47)  Assess Pain:     Frequency:  every 4 hours    Additional Instructions:  As per ABCDEF guideline ( 14:47)  Assess Neurological Status:     Type of Neuro Check:  General    Frequency:  every 4 hours ( 14:47)  Intake and Output - Strict:     Frequency:  every 1 hour ( 14:47)  Notify Provider For:     Additional Instructions:  Decline or change in neurological status ( @ 14:47)  Activity - Increase As Tolerated:     Time/Priority:  Routine ( 14:47)  VTE Prophylaxis - No Pharmacological Prophylaxis Due To::     Time/Priority:  Routine    Reason For No Pharmacologic VTE Prophylaxis  Bleeding Risk    Additional Instructions:  tpa ( @ 14:47)  Benzodiazepine, Urine: 15:50 ( @ 15:56)  THC, Urine Qualitative: 15:50 ( @ 15:56)  Cocaine Metabolite, Urine: 15:50 ( @ 15:56)  Amphetamine, Urine: 15:50 (20 @ 15:56)  Opiate, Urine: 15:50 ( @ 15:56)  Barbiturates Screen, Urine: 15:50 ( @ 15:56)  Phencyclidine Level, Urine: 15:50 ( @ 15:56)  Methadone, Urine: 15:50 ( @ 15:56)  Urine Microscopic-Add On (NC): 15:50 ( @ 15:59)  CT Head No Cont: Routine   Indication: seizure  Transport: Stretcher-Crib  Exam Completed  Provider's Contact #: [phone] ( @ 16:22)  LORazepam   Injectable: [Ordered as ATIVAN Injectable]  1 milliGRAM(s), IV Push, once, Stop After 1 Doses  Administration Instructions: This is a Look-alike/Sound-alike Medication  Provider's Contact #: [phone] ( @ 16:22)  levETIRAcetam  IVPB: [Ordered as KEPPRA IVPB]  1500 milliGRAM(s) in IV Solution 100 milliLiter(s), IV Intermittent, once, infuse over 15 Minute(s), Stop After 1 Doses  Administration Instructions: This is a Look-alike/Sound-alike Medication  Provider's Contact #: [phone] ( @ 16:22)  (ADM OVERRIDE):   Qty Removed: 1 each  Route - Dose Given <see task> ( @ 16:23)  (ADM OVERRIDE):   Qty Removed: 1 each  Route - Dose Given <see task> ( @ 16:27)  lactated ringers Bolus:   1000 milliLiter(s), IV Bolus, once, infuse over 30 Minute(s), Stop After 1 Doses  Provider's Contact #: [phone] ( @ 17:07)  TTE Echo Doppler w/o Cont:   Transport: Portable  Monitor: w/ Monitor  Provider's Contact #: [phone] ( @ 18:02)  US Duplex Carotid Arteries Complete, Bilateral: Routine   Indication: stenosis  Transport: Stretcher-Crib  Provider's Contact #: [phone] ( @ 18:02)  Lipid Profile: AM Sched. Collection: 21-Aug-2018 04:00 ( @ 18:02)  Hemoglobin A1C, Whole Blood: AM Sched. Collection: 21-Aug-2018 04:00 ( @ 18:02)  Consult- PT Evaluate and Treat:   *Reason for Consult - Must select at least one choice*     Neuro Event  Weight Bearing Restrictions: No ( @ 18:02)  Consult- OT Evaluate and Treat:   *Reason for Consult - Must select at least one choice*     ADL  Weight Bearing Restrictions: No ( @ 18:02)  Consult- Speech Bedside Swallow Evaluation:   *Reason for Consult - Must select at least one choice*     NPO Until Further Recommendations Made ( @ 18:02)  Assess Neurological Status:     Type of Neuro Check:  Stroke    Frequency:  every 1 hour ( @ 18:02)  MR Head No Cont: Routine   Indication: cva  Transport: Stretcher-Crib,  w/ Monitor  Provider's Contact #: [phone] ( @ 18:04)  Complete Blood Count: AM Sched. Collection: 21-Aug-2018 04:00 ( @ 18:07)  Comprehensive Metabolic Panel: AM Sched. Collection: 21-Aug-2018 04:00 ( @ 18:07)  Magnesium, Serum: AM Sched. Collection: 21-Aug-2018 04:00 ( @ 18:07)  Phosphorus Level, Serum: AM Sched. Collection: 21-Aug-2018 04:00 (08 @ 18:07)  Compression Device Sequential:     Body Side:  Bilateral ( @ 18:08)  levETIRAcetam  IVPB: [Ordered as KEPPRA IVPB]  1000 milliGRAM(s) in IV Solution 100 milliLiter(s), IV Intermittent, once, infuse over 15 Minute(s), Stop After 1 Doses  Administration Instructions: This is a Look-alike/Sound-alike Medication  Provider's Contact #: [phone] ( @ 18:11)  lactated ringers.: Solution, 1000 milliLiter(s) infuse at 75 mL/Hr  Provider's Contact #: [phone] ( @ 19:34)  acetaminophen   Tablet.: [Known as TYLENOL.]  650 milliGRAM(s), Oral, every 6 hours, PRN for Moderate Pain (4 - 6)  Administration Instructions: MAX DAILY DOSE:  ADULT = 4,000 mG/Day ( @ 21:19)    Assessment & Opinion:  Right CVA with history of left  sided weakness.  S/P tPA administration.    Recommendations:  Brain MRI.  Carotid doppler.  Echocardiogram.  EEG.  Lipid profile.   DVT prophylaxis as ordered.  PT/OT evaluation.    Medications:  Continue all medications.  Will follow.

## 2018-08-20 NOTE — H&P ADULT - NSHPREVIEWOFSYSTEMS_GEN_ALL_CORE
REVIEW OF SYSTEMS      REVIEW OF SYSTEMS:    CONSTITUTIONAL: No fever,   EYES: No visual disturbances  ENMT:  No difficulty hearing,  NECK: No pain or stiffness  RESPIRATORY: No cough No shortness of breath  CARDIOVASCULAR: No chest pain,  GASTROINTESTINAL: + nausea, no vomiting  NEUROLOGICAL: No headaches,left sided numbness/tingling  MUSCULOSKELETAL: No muscle, back, or extremity pain

## 2018-08-20 NOTE — ED ADULT NURSE NOTE - NSIMPLEMENTINTERV_GEN_ALL_ED
Implemented All Fall Risk Interventions:  [place] to call system. Call bell, personal items and telephone within reach. Instruct patient to call for assistance. Room bathroom lighting operational. Non-slip footwear when patient is off stretcher. Physically safe environment: no spills, clutter or unnecessary equipment. Stretcher in lowest position, wheels locked, appropriate side rails in place. Provide visual cue, wrist band, yellow gown, etc. Monitor gait and stability. Monitor for mental status changes and reorient to person, place, and time. Review medications for side effects contributing to fall risk. Reinforce activity limits and safety measures with patient and family.

## 2018-08-21 VITALS
OXYGEN SATURATION: 95 % | HEART RATE: 81 BPM | DIASTOLIC BLOOD PRESSURE: 76 MMHG | RESPIRATION RATE: 19 BRPM | SYSTOLIC BLOOD PRESSURE: 157 MMHG | TEMPERATURE: 98 F

## 2018-08-21 DIAGNOSIS — F43.10 POST-TRAUMATIC STRESS DISORDER, UNSPECIFIED: ICD-10-CM

## 2018-08-21 DIAGNOSIS — F19.11 OTHER PSYCHOACTIVE SUBSTANCE ABUSE, IN REMISSION: ICD-10-CM

## 2018-08-21 DIAGNOSIS — F41.8 OTHER SPECIFIED ANXIETY DISORDERS: ICD-10-CM

## 2018-08-21 DIAGNOSIS — F34.1 DYSTHYMIC DISORDER: ICD-10-CM

## 2018-08-21 LAB
ALBUMIN SERPL ELPH-MCNC: 2.9 G/DL — LOW (ref 3.3–5)
ALP SERPL-CCNC: 52 U/L — SIGNIFICANT CHANGE UP (ref 40–120)
ALT FLD-CCNC: 11 U/L — LOW (ref 12–78)
ANION GAP SERPL CALC-SCNC: 9 MMOL/L — SIGNIFICANT CHANGE UP (ref 5–17)
AST SERPL-CCNC: 14 U/L — LOW (ref 15–37)
BILIRUB SERPL-MCNC: 0.3 MG/DL — SIGNIFICANT CHANGE UP (ref 0.2–1.2)
BUN SERPL-MCNC: 9 MG/DL — SIGNIFICANT CHANGE UP (ref 7–23)
CALCIUM SERPL-MCNC: 8.3 MG/DL — LOW (ref 8.5–10.1)
CHLORIDE SERPL-SCNC: 111 MMOL/L — HIGH (ref 96–108)
CHOLEST SERPL-MCNC: 152 MG/DL — SIGNIFICANT CHANGE UP (ref 10–199)
CO2 SERPL-SCNC: 25 MMOL/L — SIGNIFICANT CHANGE UP (ref 22–31)
CREAT SERPL-MCNC: 0.56 MG/DL — SIGNIFICANT CHANGE UP (ref 0.5–1.3)
CULTURE RESULTS: SIGNIFICANT CHANGE UP
GLUCOSE SERPL-MCNC: 80 MG/DL — SIGNIFICANT CHANGE UP (ref 70–99)
HBA1C BLD-MCNC: 5.6 % — SIGNIFICANT CHANGE UP (ref 4–5.6)
HCT VFR BLD CALC: 35.9 % — SIGNIFICANT CHANGE UP (ref 34.5–45)
HDLC SERPL-MCNC: 56 MG/DL — SIGNIFICANT CHANGE UP
HGB BLD-MCNC: 11.6 G/DL — SIGNIFICANT CHANGE UP (ref 11.5–15.5)
LIPID PNL WITH DIRECT LDL SERPL: 74 MG/DL — SIGNIFICANT CHANGE UP
MAGNESIUM SERPL-MCNC: 2.2 MG/DL — SIGNIFICANT CHANGE UP (ref 1.6–2.6)
MCHC RBC-ENTMCNC: 27.3 PG — SIGNIFICANT CHANGE UP (ref 27–34)
MCHC RBC-ENTMCNC: 32.3 GM/DL — SIGNIFICANT CHANGE UP (ref 32–36)
MCV RBC AUTO: 84.5 FL — SIGNIFICANT CHANGE UP (ref 80–100)
NRBC # BLD: 0 /100 WBCS — SIGNIFICANT CHANGE UP (ref 0–0)
PHOSPHATE SERPL-MCNC: 3.7 MG/DL — SIGNIFICANT CHANGE UP (ref 2.5–4.5)
PLATELET # BLD AUTO: 236 K/UL — SIGNIFICANT CHANGE UP (ref 150–400)
POTASSIUM SERPL-MCNC: 4.1 MMOL/L — SIGNIFICANT CHANGE UP (ref 3.5–5.3)
POTASSIUM SERPL-SCNC: 4.1 MMOL/L — SIGNIFICANT CHANGE UP (ref 3.5–5.3)
PROT SERPL-MCNC: 5.6 GM/DL — LOW (ref 6–8.3)
RBC # BLD: 4.25 M/UL — SIGNIFICANT CHANGE UP (ref 3.8–5.2)
RBC # FLD: 13.6 % — SIGNIFICANT CHANGE UP (ref 10.3–14.5)
SODIUM SERPL-SCNC: 145 MMOL/L — SIGNIFICANT CHANGE UP (ref 135–145)
SPECIMEN SOURCE: SIGNIFICANT CHANGE UP
TOTAL CHOLESTEROL/HDL RATIO MEASUREMENT: 2.7 RATIO — LOW (ref 3.3–7.1)
TRIGL SERPL-MCNC: 108 MG/DL — SIGNIFICANT CHANGE UP (ref 10–149)
WBC # BLD: 6.88 K/UL — SIGNIFICANT CHANGE UP (ref 3.8–10.5)
WBC # FLD AUTO: 6.88 K/UL — SIGNIFICANT CHANGE UP (ref 3.8–10.5)

## 2018-08-21 PROCEDURE — 70551 MRI BRAIN STEM W/O DYE: CPT | Mod: 26

## 2018-08-21 PROCEDURE — 99233 SBSQ HOSP IP/OBS HIGH 50: CPT

## 2018-08-21 PROCEDURE — 90792 PSYCH DIAG EVAL W/MED SRVCS: CPT

## 2018-08-21 RX ORDER — ATORVASTATIN CALCIUM 80 MG/1
40 TABLET, FILM COATED ORAL AT BEDTIME
Qty: 0 | Refills: 0 | Status: DISCONTINUED | OUTPATIENT
Start: 2018-08-21 | End: 2018-08-21

## 2018-08-21 RX ORDER — ASPIRIN/CALCIUM CARB/MAGNESIUM 324 MG
81 TABLET ORAL DAILY
Qty: 0 | Refills: 0 | Status: DISCONTINUED | OUTPATIENT
Start: 2018-08-22 | End: 2018-08-21

## 2018-08-21 RX ORDER — ENOXAPARIN SODIUM 100 MG/ML
40 INJECTION SUBCUTANEOUS EVERY 24 HOURS
Qty: 0 | Refills: 0 | Status: DISCONTINUED | OUTPATIENT
Start: 2018-08-21 | End: 2018-08-21

## 2018-08-21 RX ORDER — ESCITALOPRAM OXALATE 10 MG/1
5 TABLET, FILM COATED ORAL
Qty: 0 | Refills: 0 | Status: DISCONTINUED | OUTPATIENT
Start: 2018-08-21 | End: 2018-08-21

## 2018-08-21 RX ORDER — NICOTINE POLACRILEX 2 MG
1 GUM BUCCAL DAILY
Qty: 0 | Refills: 0 | Status: DISCONTINUED | OUTPATIENT
Start: 2018-08-21 | End: 2018-08-21

## 2018-08-21 RX ORDER — KETOROLAC TROMETHAMINE 30 MG/ML
15 SYRINGE (ML) INJECTION EVERY 8 HOURS
Qty: 0 | Refills: 0 | Status: DISCONTINUED | OUTPATIENT
Start: 2018-08-21 | End: 2018-08-21

## 2018-08-21 RX ORDER — ASPIRIN/CALCIUM CARB/MAGNESIUM 324 MG
81 TABLET ORAL ONCE
Qty: 0 | Refills: 0 | Status: COMPLETED | OUTPATIENT
Start: 2018-08-21 | End: 2018-08-21

## 2018-08-21 RX ADMIN — Medication 81 MILLIGRAM(S): at 16:25

## 2018-08-21 RX ADMIN — Medication 650 MILLIGRAM(S): at 10:30

## 2018-08-21 RX ADMIN — SODIUM CHLORIDE 75 MILLILITER(S): 9 INJECTION, SOLUTION INTRAVENOUS at 09:44

## 2018-08-21 RX ADMIN — Medication 650 MILLIGRAM(S): at 10:13

## 2018-08-21 RX ADMIN — ESCITALOPRAM OXALATE 5 MILLIGRAM(S): 10 TABLET, FILM COATED ORAL at 16:58

## 2018-08-21 NOTE — BEHAVIORAL HEALTH ASSESSMENT NOTE - NSBHSUICPROTECTFACT_PSY_A_CORE
Identifies reasons for living/Supportive social network or family/Future oriented/Positive therapeutic relationships/Responsibility to family and others/Engaged in work or school

## 2018-08-21 NOTE — DIETITIAN INITIAL EVALUATION ADULT. - OTHER INFO
Pt seen for ICU admission. Pt lives with daughter. Pt does cooking & food shopping PTA.  Pt reports good appetite PTA. Pt admitted with CVA with Left sided weakness & TPA given. No GI distress. pt reports last BM x 1( Saturday 8/18). Swallow evaluation 8/21 recommend Regular consistency/thin liquids

## 2018-08-21 NOTE — PROGRESS NOTE ADULT - ASSESSMENT
63F no PMH except being an ex-smoker on nicotine patch admitted for acute left sided weakness that started on morning of presentation while at work.  Pt works as a  at a gym. States she just didn't feel well and then started experiencing left sided upper and lower extremity tingling and numbness with subsequent left sided weakness.  Pt also states having some dizziness with nausea and then just had a "bad feeling".  Pt told co-workers and EMS called and brought pt in to ER with CT  head negative.  Decision was made for TPA.  ICU called for evaluation and admitted to our service.  While awaiting transfer to ICU, pt had mild seizure like activity with left facial twitching (however pt awake, alert, responsive throughout event).  Pt given ativan 1mg and keppra load of 1500mg in ER.  Repeat head CT negative for acute pathology. MRI without evidence of acute infarct.     1. NEURO  - pt presents with stroke like symptoms: left sided weakness and paresthesia  - given tPA in ED  - pt with occasional fleeting left sided weakness. per nursing pt when in room by self noted to be able to move left arm to adjust her pillow and blankets. Symptoms of weakness seem to worsen when family is at bedside especially with onset of seizure like activity with left facial twitching (during which pt is fully awake and oriented, twitching stops when pt answers questions or when asked to perform tasks)  - EEG performed today, follow up results  - uncertain if pt truly have focal seizure  - start ASA and statin therapy  - PT/OT  - passed swallow eval  - neurology eval    2. CV  - baseline BP 90s to low 100s per patient  - pt awake and mentating    3. PSYCH  - appreciate psych eval  - recommend Lexapro for underlying depression    4. Gen  - stable for transfer to medical floor

## 2018-08-21 NOTE — DIETITIAN INITIAL EVALUATION ADULT. - ADHERENCE
Regular diet PTA; Breakfast; Coffee & Bagel twist with chedder cheese; Lunch; skip Dinner; salad, baked chicken or salmon, fruit(cherries, peaches or grapes) & sweet potatoes, green beans/n/a
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
## 2018-08-21 NOTE — BEHAVIORAL HEALTH ASSESSMENT NOTE - RISK ASSESSMENT
has chronic risks: chronic depression-anxiety, ptsd/trauma, intermittent automatic negative thoughts gissell of si with no intent-but is alarmed about it, has insight about it, able to engage in some effective tools to cope with it- exercise, work, attend NA meetings, and is now seeking psychiatric help. Sleep can be fractured, h/o inpt psych admissions, h/o of si or sa- 17 years ago, is future oriented, engaged in work, able to state reasons to live, engages well in safety, discharge and treatment planning

## 2018-08-21 NOTE — BEHAVIORAL HEALTH ASSESSMENT NOTE - NSBHCONSULTMEDS_PSY_A_CORE FT
Start a trial of Lexapro 5mg q am po.  Will coordinate with SW to ensure patient gets appts at [facility] prior to d/c  Will give other resources to patient as well

## 2018-08-21 NOTE — DIETITIAN INITIAL EVALUATION ADULT. - PERTINENT LABORATORY DATA
08-21 Na 145 mmol/L Glu 80 mg/dL K+ 4.1 mmol/L Cr  0.56 mg/dL BUN 9 mg/dL Phos 3.7 mg/dL Alb 2.9 g/dL<L> PAB n/a   Hgb 11.6 g/dL Hct 35.9 %, Phos=3.7(8/21), EQMS=037(8/20), 08-21 Chol 152 LDL 74 HDL 56 Trig 108, YimB3P=6.6%(8/21)

## 2018-08-21 NOTE — SWALLOW BEDSIDE ASSESSMENT ADULT - COMMENTS
Xray Chest 8/20/2018 IMPRESSION: No consolidation, pleural effusion or pneumothorax. Linear atelectasis at the left base. The cardiomediastinal silhouette is normal.    CT Head 8/20/2018 IMPRESSION: IMPRESSION: Unremarkable study without intracranial hemorrhage, mass effect, or CT evidence of acute territorial infarct.

## 2018-08-21 NOTE — SWALLOW BEDSIDE ASSESSMENT ADULT - SWALLOW EVAL: DIAGNOSIS
pt presented with oropharyngeal phases of swallow within normal limits. No overt signs of aspiration noted.

## 2018-08-21 NOTE — PROGRESS NOTE ADULT - SUBJECTIVE AND OBJECTIVE BOX
HPI:  63F no PMH except being an ex-smoker on nicotine patch admitted for acute left sided weakness that started on morning of presentation while at work.  Pt works as a  at a gym. States she just didn't feel well and then started experiencing left sided upper and lower extremity tingling and numbness with subsequent left sided weakness.  Pt also states having some dizziness with nausea and then just had a "bad feeling".  Pt told co-workers and EMS called and brought pt in to ER and CT  head negative.  Decision was made for TPA.  ICU called for evaluation and admitted to our service.  While awaiting bed, pt had mild seizure activity with facial twitching.  Pt given ativan 1mg and keppra load of 1500mg.  Repeat head CT negative for acute pathology (20 Aug 2018 18:31)      24 hr events:      ## ROS:  [ ] unable to obtain  CONSTITUTIONAL: No fever, weight loss, or fatigue  EYES: No eye pain, visual disturbances, or discharge  ENMT:  No difficulty hearing, tinnitus, vertigo; No sinus or throat pain  NECK: No pain or stiffness  RESPIRATORY: No cough, wheezing, chills or hemoptysis; No shortness of breath  CARDIOVASCULAR: No chest pain, palpitations, dizziness, or leg swelling  GASTROINTESTINAL: No abdominal or epigastric pain. No nausea, vomiting, or hematemesis; No diarrhea or constipation. No melena or hematochezia.  GENITOURINARY: No dysuria, frequency, hematuria, or incontinence  NEUROLOGICAL: No headaches, memory loss, loss of strength, numbness, or tremors  SKIN: No itching, burning, rashes, or lesions   LYMPH NODES: No enlarged glands  ENDOCRINE: No heat or cold intolerance; No hair loss  MUSCULOSKELETAL: No joint pain or swelling; No muscle, back, or extremity pain  PSYCHIATRIC: No depression, anxiety, mood swings, or difficulty sleeping  HEME/LYMPH: No easy bruising, or bleeding gums  ALLERGY AND IMMUNOLOGIC: No hives or eczema    ## Labs:  CBC:                        11.6   6.88  )-----------( 236      ( 21 Aug 2018 03:52 )             35.9     Chem:  08-    145  |  111<H>  |  9   ----------------------------<  80  4.1   |  25  |  0.56    Ca    8.3<L>      21 Aug 2018 03:52  Phos  3.7       Mg     2.2         TPro  5.6<L>  /  Alb  2.9<L>  /  TBili  0.3  /  DBili  x   /  AST  14<L>  /  ALT  11<L>  /  AlkPhos  52      Coags:  PT/INR - ( 20 Aug 2018 12:19 )   PT: 10.5 sec;   INR: 0.96 ratio         PTT - ( 20 Aug 2018 12:19 )  PTT:32.7 sec        ## Imaging:    ## Medications:        atorvastatin 40 milliGRAM(s) Oral at bedtime    aspirin enteric coated 81 milliGRAM(s) Oral once      acetaminophen   Tablet. 650 milliGRAM(s) Oral every 6 hours PRN  escitalopram 5 milliGRAM(s) Oral <User Schedule>      ## Vitals:  T(C): 36.6 (18 @ 15:11), Max: 36.6 (18 @ 08:25)  HR: 73 (18 @ 15:00) (60 - 85)  BP: 117/72 (18 @ 14:00) (80/54 - 119/70)  BP(mean): 86 (18 @ 14:00) (60 - 91)  RR: 19 (18 @ 15:00) (8 - 24)  SpO2: 97% (18 @ 15:00) (96% - 100%)  Wt(kg): --  Vent:   AB-20 @ 07:  -   @ 07:00  --------------------------------------------------------  IN: 825 mL / OUT: 0 mL / NET: 825 mL     @ 07:01  -   @ 16:16  --------------------------------------------------------  IN: 450 mL / OUT: 0 mL / NET: 450 mL          ## P/E:  Gen: lying comfortably in bed in no apparent distress  HEENT: PERRL, EOMI  Resp: CTA B/L no c/r/w  CVS: S1S2 no m/r/g  Abd: soft NT/ND +BS  Ext: no c/c/e  Neuro: A&Ox3    CENTRAL LINE: [ ] YES [ ] NO  LOCATION:   DATE INSERTED:  REMOVE: [ ] YES [ ] NO      CASTANEDA: [ ] YES [ ] NO    DATE INSERTED:  REMOVE:  [ ] YES [ ] NO      A-LINE:  [ ] YES [ ] NO  LOCATION:   DATE INSERTED:  REMOVE:  [ ] YES [ ] NO  EXPLAIN:    GLOBAL ISSUE/BEST PRACTICE:  Analgesia:  Sedation:  HOB elevation: yes  Stress ulcer prophylaxis:  VTE prophylaxis:  Oral Care:  Glycemic control:  Nutrition:    CODE STATUS: [ ] full code  [ ] DNR  [ ] DNI  [ ] MOLST  Goals of care discussion: [ ] yes HPI:  63F no PMH except being an ex-smoker on nicotine patch admitted for acute left sided weakness that started on morning of presentation while at work.  Pt works as a  at a gym. States she just didn't feel well and then started experiencing left sided upper and lower extremity tingling and numbness with subsequent left sided weakness.  Pt also states having some dizziness with nausea and then just had a "bad feeling".  Pt told co-workers and EMS called and brought pt in to ER with CT  head negative.  Decision was made for TPA.  ICU called for evaluation and admitted to our service.  While awaiting transfer to ICU, pt had mild seizure like activity with left facial twitching (however pt awake, alert, responsive throughout event).  Pt given ativan 1mg and keppra load of 1500mg in ER.  Repeat head CT negative for acute pathology.      24 hr events:  left sided weakness persists however with better strength and ability to move left arm and toes today  facial droop improved  pt with left sided facial twitching noted overnight especially in the presence of family at bedside but would be fully awake and answering questions during event. states she feels the twitch and tries to control it from happening. Twitching stops when pt starts speaking and answering questions  this morning, pt reported to nurse that she's been feeling very depressed and has had suicidal ideations.       ## ROS:  CONSTITUTIONAL: No fever, no weight loss  EYES: reports blurry vision that was in both eyes yesterday is not only in left eye  ENMT:  No difficulty hearing, no tinnitus, No sinus or throat pain  NECK: No pain or stiffness  RESPIRATORY: No cough, No shortness of breath  CARDIOVASCULAR: No chest pain, no palpitations  GASTROINTESTINAL: No abdominal or epigastric pain. No nausea, no vomiting  GENITOURINARY: No dysuria, no frequency  NEUROLOGICAL: No headaches, no dizziness, left sided weakness  SKIN: No itching, burning, rashes, or lesions   MUSCULOSKELETAL: back pain  PSYCHIATRIC: + depression      ## Labs:  CBC:                        11.6   6.88  )-----------( 236      ( 21 Aug 2018 03:52 )             35.9     Chem:  08-21    145  |  111<H>  |  9   ----------------------------<  80  4.1   |  25  |  0.56    Ca    8.3<L>      21 Aug 2018 03:52  Phos  3.7     08-21  Mg     2.2     08-21    TPro  5.6<L>  /  Alb  2.9<L>  /  TBili  0.3  / AST  14<L>  /  ALT  11<L>  /  AlkPhos  52  08-21    Coags:  PT/INR - ( 20 Aug 2018 12:19 )   PT: 10.5 sec;   INR: 0.96 ratio    PTT - ( 20 Aug 2018 12:19 )  PTT:32.7 sec    Lipid Profile in AM (08.21.18 @ 08:10)    Total Cholesterol/HDL Ratio Measurement: 2.7 RATIO    Cholesterol, Serum: 152 mg/dL    Triglycerides, Serum: 108 mg/dL    HDL Cholesterol, Serum: 56 mg/dL    Direct LDL: 74 mg/dL    Hemoglobin A1C, Whole Blood in AM (08.21.18 @ 08:10)    Hemoglobin A1C, Whole Blood: 5.6 %      ## Imaging:  CT head:  < from: CT Head No Cont (08.20.18 @ 16:50) >  Unremarkable study without intracranial hemorrhage, mass effect, or CT   evidence of acute territorial infarct.      CT angio < from: CT Angio Head w/ IV Cont (08.20.18 @ 12:59) >  1. Widely patent intracerebral vasculature without evidence of a proximal   stenosis orocclusion. No aneurysm identified.  2. There is segmental narrowing and tapering of multiple distal vessels   with irregularity raising the possibility of vasculitis and/or   angiopathy.. Further workup and assessment recommended.        MRI brain < from: MR Head No Cont (08.21.18 @ 15:49) >  There is no cortical edema, mass effect or hydrocephalus. White matter   signal is grossly preserved. There is no evidence of acute infarct or   previous parenchymal hemorrhage. The orbital and sellar contents and   cerebellar tonsils are within normal limits      ## Medications:  atorvastatin 40 milliGRAM(s) Oral at bedtime    aspirin enteric coated 81 milliGRAM(s) Oral once      acetaminophen   Tablet. 650 milliGRAM(s) Oral every 6 hours PRN  escitalopram 5 milliGRAM(s) Oral <User Schedule>      ## Vitals:  T(C): 36.6 (08-21-18 @ 15:11), Max: 36.6 (08-21-18 @ 08:25)  HR: 73 (08-21-18 @ 15:00) (60 - 85)  BP: 117/72 (08-21-18 @ 14:00) (80/54 - 119/70)  BP(mean): 86 (08-21-18 @ 14:00) (60 - 91)  RR: 19 (08-21-18 @ 15:00) (8 - 24)  SpO2: 97% (08-21-18 @ 15:00) (96% - 100%)      08-20 @ 07:01  -  08-21 @ 07:00  --------------------------------------------------------  IN: 825 mL / OUT: 0 mL / NET: 825 mL    08-21 @ 07:01  -  08-21 @ 16:16  --------------------------------------------------------  IN: 450 mL / OUT: 0 mL / NET: 450 mL          ## P/E:  Gen: lying comfortably in bed in no apparent distress  HEENT: PERRL, EOMI  Resp: CTA B/L   CVS: S1S2 RRR  Abd: soft NT/ND +BS  Ext: no c/c/e  Neuro: A&Ox3, mild left facial droop significantly improved, left sided weakness persists but improved    CENTRAL LINE: [ ] YES [x] NO  LOCATION:   DATE INSERTED:  REMOVE: [ ] YES [ ] NO      CASTANEDA: [ ] YES [x] NO    DATE INSERTED:  REMOVE:  [ ] YES [ ] NO      A-LINE:  [ ] YES [x] NO  LOCATION:   DATE INSERTED:  REMOVE:  [ ] YES [ ] NO  EXPLAIN:    GLOBAL ISSUE/BEST PRACTICE:  Analgesia: n/a  Sedation: n/a  HOB elevation: yes  Stress ulcer prophylaxis: n/a  VTE prophylaxis: bilateral compression devices  Oral Care: n/a  Glycemic control: n/a  Nutrition: low salt low cholesterol diet    CODE STATUS: [x] full code  [ ] DNR  [ ] DNI  [ ] MOLST  Goals of care discussion: [ ] yes

## 2018-08-21 NOTE — BEHAVIORAL HEALTH ASSESSMENT NOTE - OTHER PAST PSYCHIATRIC HISTORY (INCLUDE DETAILS REGARDING ONSET, COURSE OF ILLNESS, INPATIENT/OUTPATIENT TREATMENT)
has a psychiatric history significant for trauma/PTSD, Chronic depression-anxiety, Polysubstance dependence (in remission for the last 17 years), has a history of inpatient psychiatric admissions for SI/SA (overdose, cutting wrist)- 10 years ago

## 2018-08-21 NOTE — BEHAVIORAL HEALTH ASSESSMENT NOTE - SUMMARY
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 TPA given in ED. Some concerns for psychogenic component to her presentation. Neurological work up regardless ongoing. Patient apparently verbalized si statement to rn and hence this consultation.   Based on assessment done today, patient has been experiencing chronic depressive anxiety symptoms for many years, with intermittent worsening in context to stressors. Sleep can be fractured at night due to nightmares. She has a h/o of trama, and likely meets criteria for PTSD. Chronic melancholia in part due to prior traumatic experiences, and also due to untreated depression. She does have intermittent automatic negative thoughts gissell of SI, but is alarmed about it, has insight about it, able to engage in some effective tools to cope with it- exercise, work, attend NA meetings, and is now seeking psychiatric help. She denies any si/i/i/p- thinks an ideation is selfish and loves her family, and feels that she cannot inflict that pain on anyone. Also feels that she must be a good role model for her grand son who is an addict himself.   Patient not keen on an inpatient psychiatric admission, but very actively engaged with md with outpatient psychiatric services. No indication for an invol admission.

## 2018-08-21 NOTE — SWALLOW BEDSIDE ASSESSMENT ADULT - SLP GENERAL OBSERVATIONS
[place] level 4b. Pt seen bedside, alert and oriented x4.  she responded to simple autobiographical/want questions and followed one step directions. Noted x1 facial twitch. pt's mother and daughter present. [place] level 4b. Pt seen bedside, alert and oriented x4.  she responded to simple autobiographical/want questions and followed one step directions. Noted x1 facial twitch during eval. pt's mother and daughter present.

## 2018-08-22 NOTE — CHART NOTE - NSCHARTNOTEFT_GEN_A_CORE
63F no PMH except being an ex-smoker on nicotine patch admitted for acute left sided weakness that started on morning of presentation while at work.  Pt works as a  at a gym. States she just didn't feel well and then started experiencing left sided upper and lower extremity tingling and numbness with subsequent left sided weakness.  Pt also states having some dizziness with nausea and then just had a "bad feeling".  Pt told co-workers and EMS called and brought pt in to ER and CT  head negative.    Patient admitted to critical care after tpa.  Overnight patient did fine, neuro exam improved, had MRI done today, which was negative for bleed or stroke.  Patient passed swallow, on regular diet, moves all 4 extremities    Patient stable for transfer to med/surg  signed out to Dr Chaudhry
This MD had seen the patient yesterday for a psychiatric evaluation. Patient had admitted to chronic depressive symptoms, but denied any si or hi yesterday, was future oriented, able to state reasons to live.   The plan for today was to coordinate with family, assess how she felt on Lexapro, and also to establish a robust psychiatric outpatient plan for her.   Overnight patient became frustrated with the medical team, complaining of care, etc, and AWOL'd from the hospital.   Unable to reach the patient as no phone numbers listed.     Will continue to work on reaching the patient so that I can at least get patient engaged in outpatient psychiatric treatment, which she was very keen on.     Discussed with my coordinator Moris Eubanks.     Margie Aguilar MD  Attending psychiatrist
I was called by RN that patient was downgraded from ICU and wants to see MD as she is saying " didn't see any doctor all day".   Went to see patient. Found her very upset, complaining that no one saw her and no one can find what is wrong with her. Explained top her why she was given TPA for suspected CVA and repeat imaging results. explained awaiting EEG official report, 2D echo report, carotid doppler. I told patient I will discuss with neurologist and if he is ok, will discharge patient and she need to follow up with neurologist, but patient insisted that she will leave now and will not let us take IV line out despite mentioning recent TPA and risk of bleeding. She refused to sign AMA. I mentioned to her that I will discharge her now if cleared by neurology. Pt didn't want to wait and walked out saying she will hold us responsible if anything happens to her. Daughter was present at bedside, though she is also upset but wanted to give time to talk to neurologist and at least remove IV access before leaving. Code flight was called. Nursing manager/ security trying to reach daughter.

## 2018-08-22 NOTE — BEHAVIORAL HEALTH ASSESSMENT NOTE - NS ED [facility] MED ROS CONSTITUTIONAL SYMPTOMS
Patient via EMS, fell down approx. 10 steps at home, hit her head and then landed into a door. Has skin tears to bilateral lower legs and laceration to the back of her head.  
No complaints

## 2018-08-27 DIAGNOSIS — G81.94 HEMIPLEGIA, UNSPECIFIED AFFECTING LEFT NONDOMINANT SIDE: ICD-10-CM

## 2018-08-27 DIAGNOSIS — I63.9 CEREBRAL INFARCTION, UNSPECIFIED: ICD-10-CM

## 2018-08-27 DIAGNOSIS — R45.851 SUICIDAL IDEATIONS: ICD-10-CM

## 2018-08-27 DIAGNOSIS — F32.89 OTHER SPECIFIED DEPRESSIVE EPISODES: ICD-10-CM

## 2018-08-27 DIAGNOSIS — F19.11 OTHER PSYCHOACTIVE SUBSTANCE ABUSE, IN REMISSION: ICD-10-CM

## 2018-08-27 DIAGNOSIS — F41.8 OTHER SPECIFIED ANXIETY DISORDERS: ICD-10-CM

## 2018-08-27 DIAGNOSIS — Z53.21 PROCEDURE AND TREATMENT NOT CARRIED OUT DUE TO PATIENT LEAVING PRIOR TO BEING SEEN BY HEALTH CARE PROVIDER: ICD-10-CM

## 2018-08-27 DIAGNOSIS — F43.10 POST-TRAUMATIC STRESS DISORDER, UNSPECIFIED: ICD-10-CM

## 2018-08-27 DIAGNOSIS — Z87.891 PERSONAL HISTORY OF NICOTINE DEPENDENCE: ICD-10-CM

## 2018-08-27 DIAGNOSIS — R25.3 FASCICULATION: ICD-10-CM

## 2018-08-27 DIAGNOSIS — R29.709 NIHSS SCORE 9: ICD-10-CM

## 2018-08-27 DIAGNOSIS — Z91.5 PERSONAL HISTORY OF SELF-HARM: ICD-10-CM

## 2018-08-27 DIAGNOSIS — F34.1 DYSTHYMIC DISORDER: ICD-10-CM

## 2018-08-27 DIAGNOSIS — G40.89 OTHER SEIZURES: ICD-10-CM

## 2021-06-28 NOTE — BEHAVIORAL HEALTH ASSESSMENT NOTE - NS ED [facility] REVIEW OF ED CHART AVAILABLE INVESTIGATIONS REVIEWED
Bed: ED09  Expected date: 6/28/21  Expected time: 6:21 PM  Means of arrival: Ambulance  Comments:  [id number]   Yes

## 2021-10-29 NOTE — ED ADULT TRIAGE NOTE - CADM TRG TX PRIOR TO ARRIVAL
Received results of labs LDL has significantly decreased went from 238 to 46     Triglycerides are still elevated, pt is on repatha, lovaza.     Reviewed with AB tell patient make sure she is not drink any alcohol and repeat labs In 6 months   She v/u and agreement     Also reviewed results of genetic testing, advised children labs be checked    none

## 2024-01-10 ENCOUNTER — EMERGENCY (EMERGENCY)
Facility: HOSPITAL | Age: 69
LOS: 0 days | Discharge: HOME | End: 2024-01-10
Attending: EMERGENCY MEDICINE
Payer: MEDICARE

## 2024-01-10 VITALS
HEART RATE: 95 BPM | TEMPERATURE: 98 F | SYSTOLIC BLOOD PRESSURE: 116 MMHG | DIASTOLIC BLOOD PRESSURE: 66 MMHG | OXYGEN SATURATION: 100 % | RESPIRATION RATE: 20 BRPM | WEIGHT: 104.94 LBS

## 2024-01-10 DIAGNOSIS — R22.0 LOCALIZED SWELLING, MASS AND LUMP, HEAD: ICD-10-CM

## 2024-01-10 DIAGNOSIS — K86.89 OTHER SPECIFIED DISEASES OF PANCREAS: ICD-10-CM

## 2024-01-10 DIAGNOSIS — R05.1 ACUTE COUGH: ICD-10-CM

## 2024-01-10 DIAGNOSIS — R10.9 UNSPECIFIED ABDOMINAL PAIN: ICD-10-CM

## 2024-01-10 DIAGNOSIS — K04.7 PERIAPICAL ABSCESS WITHOUT SINUS: ICD-10-CM

## 2024-01-10 DIAGNOSIS — F17.200 NICOTINE DEPENDENCE, UNSPECIFIED, UNCOMPLICATED: ICD-10-CM

## 2024-01-10 LAB
ALBUMIN SERPL ELPH-MCNC: 4.3 G/DL — SIGNIFICANT CHANGE UP (ref 3.5–5.2)
ALBUMIN SERPL ELPH-MCNC: 4.3 G/DL — SIGNIFICANT CHANGE UP (ref 3.5–5.2)
ALP SERPL-CCNC: 74 U/L — SIGNIFICANT CHANGE UP (ref 30–115)
ALP SERPL-CCNC: 74 U/L — SIGNIFICANT CHANGE UP (ref 30–115)
ALT FLD-CCNC: 17 U/L — SIGNIFICANT CHANGE UP (ref 0–41)
ALT FLD-CCNC: 17 U/L — SIGNIFICANT CHANGE UP (ref 0–41)
ANION GAP SERPL CALC-SCNC: 11 MMOL/L — SIGNIFICANT CHANGE UP (ref 7–14)
ANION GAP SERPL CALC-SCNC: 11 MMOL/L — SIGNIFICANT CHANGE UP (ref 7–14)
APPEARANCE UR: CLEAR — SIGNIFICANT CHANGE UP
APPEARANCE UR: CLEAR — SIGNIFICANT CHANGE UP
AST SERPL-CCNC: 15 U/L — SIGNIFICANT CHANGE UP (ref 0–41)
AST SERPL-CCNC: 15 U/L — SIGNIFICANT CHANGE UP (ref 0–41)
BACTERIA # UR AUTO: NEGATIVE /HPF — SIGNIFICANT CHANGE UP
BACTERIA # UR AUTO: NEGATIVE /HPF — SIGNIFICANT CHANGE UP
BASOPHILS # BLD AUTO: 0.09 K/UL — SIGNIFICANT CHANGE UP (ref 0–0.2)
BASOPHILS # BLD AUTO: 0.09 K/UL — SIGNIFICANT CHANGE UP (ref 0–0.2)
BASOPHILS NFR BLD AUTO: 1.2 % — HIGH (ref 0–1)
BASOPHILS NFR BLD AUTO: 1.2 % — HIGH (ref 0–1)
BILIRUB DIRECT SERPL-MCNC: <0.2 MG/DL — SIGNIFICANT CHANGE UP (ref 0–0.3)
BILIRUB DIRECT SERPL-MCNC: <0.2 MG/DL — SIGNIFICANT CHANGE UP (ref 0–0.3)
BILIRUB INDIRECT FLD-MCNC: >0.2 MG/DL — SIGNIFICANT CHANGE UP (ref 0.2–1.2)
BILIRUB INDIRECT FLD-MCNC: >0.2 MG/DL — SIGNIFICANT CHANGE UP (ref 0.2–1.2)
BILIRUB SERPL-MCNC: 0.4 MG/DL — SIGNIFICANT CHANGE UP (ref 0.2–1.2)
BILIRUB SERPL-MCNC: 0.4 MG/DL — SIGNIFICANT CHANGE UP (ref 0.2–1.2)
BILIRUB UR-MCNC: NEGATIVE — SIGNIFICANT CHANGE UP
BILIRUB UR-MCNC: NEGATIVE — SIGNIFICANT CHANGE UP
BUN SERPL-MCNC: 10 MG/DL — SIGNIFICANT CHANGE UP (ref 10–20)
BUN SERPL-MCNC: 10 MG/DL — SIGNIFICANT CHANGE UP (ref 10–20)
CALCIUM SERPL-MCNC: 9.1 MG/DL — SIGNIFICANT CHANGE UP (ref 8.4–10.5)
CALCIUM SERPL-MCNC: 9.1 MG/DL — SIGNIFICANT CHANGE UP (ref 8.4–10.5)
CAST: 0 /LPF — SIGNIFICANT CHANGE UP (ref 0–4)
CAST: 0 /LPF — SIGNIFICANT CHANGE UP (ref 0–4)
CHLORIDE SERPL-SCNC: 104 MMOL/L — SIGNIFICANT CHANGE UP (ref 98–110)
CHLORIDE SERPL-SCNC: 104 MMOL/L — SIGNIFICANT CHANGE UP (ref 98–110)
CO2 SERPL-SCNC: 24 MMOL/L — SIGNIFICANT CHANGE UP (ref 17–32)
CO2 SERPL-SCNC: 24 MMOL/L — SIGNIFICANT CHANGE UP (ref 17–32)
COLOR SPEC: YELLOW — SIGNIFICANT CHANGE UP
COLOR SPEC: YELLOW — SIGNIFICANT CHANGE UP
CREAT SERPL-MCNC: 0.7 MG/DL — SIGNIFICANT CHANGE UP (ref 0.7–1.5)
CREAT SERPL-MCNC: 0.7 MG/DL — SIGNIFICANT CHANGE UP (ref 0.7–1.5)
DIFF PNL FLD: ABNORMAL
DIFF PNL FLD: ABNORMAL
EGFR: 95 ML/MIN/1.73M2 — SIGNIFICANT CHANGE UP
EGFR: 95 ML/MIN/1.73M2 — SIGNIFICANT CHANGE UP
EOSINOPHIL # BLD AUTO: 0.49 K/UL — SIGNIFICANT CHANGE UP (ref 0–0.7)
EOSINOPHIL # BLD AUTO: 0.49 K/UL — SIGNIFICANT CHANGE UP (ref 0–0.7)
EOSINOPHIL NFR BLD AUTO: 6.3 % — SIGNIFICANT CHANGE UP (ref 0–8)
EOSINOPHIL NFR BLD AUTO: 6.3 % — SIGNIFICANT CHANGE UP (ref 0–8)
GLUCOSE SERPL-MCNC: 83 MG/DL — SIGNIFICANT CHANGE UP (ref 70–99)
GLUCOSE SERPL-MCNC: 83 MG/DL — SIGNIFICANT CHANGE UP (ref 70–99)
GLUCOSE UR QL: NEGATIVE MG/DL — SIGNIFICANT CHANGE UP
GLUCOSE UR QL: NEGATIVE MG/DL — SIGNIFICANT CHANGE UP
HCT VFR BLD CALC: 39 % — SIGNIFICANT CHANGE UP (ref 37–47)
HCT VFR BLD CALC: 39 % — SIGNIFICANT CHANGE UP (ref 37–47)
HGB BLD-MCNC: 12.6 G/DL — SIGNIFICANT CHANGE UP (ref 12–16)
HGB BLD-MCNC: 12.6 G/DL — SIGNIFICANT CHANGE UP (ref 12–16)
IMM GRANULOCYTES NFR BLD AUTO: 0.4 % — HIGH (ref 0.1–0.3)
IMM GRANULOCYTES NFR BLD AUTO: 0.4 % — HIGH (ref 0.1–0.3)
KETONES UR-MCNC: NEGATIVE MG/DL — SIGNIFICANT CHANGE UP
KETONES UR-MCNC: NEGATIVE MG/DL — SIGNIFICANT CHANGE UP
LACTATE SERPL-SCNC: 0.8 MMOL/L — SIGNIFICANT CHANGE UP (ref 0.7–2)
LACTATE SERPL-SCNC: 0.8 MMOL/L — SIGNIFICANT CHANGE UP (ref 0.7–2)
LEUKOCYTE ESTERASE UR-ACNC: NEGATIVE — SIGNIFICANT CHANGE UP
LEUKOCYTE ESTERASE UR-ACNC: NEGATIVE — SIGNIFICANT CHANGE UP
LIDOCAIN IGE QN: 53 U/L — SIGNIFICANT CHANGE UP (ref 7–60)
LIDOCAIN IGE QN: 53 U/L — SIGNIFICANT CHANGE UP (ref 7–60)
LYMPHOCYTES # BLD AUTO: 1.49 K/UL — SIGNIFICANT CHANGE UP (ref 1.2–3.4)
LYMPHOCYTES # BLD AUTO: 1.49 K/UL — SIGNIFICANT CHANGE UP (ref 1.2–3.4)
LYMPHOCYTES # BLD AUTO: 19.3 % — LOW (ref 20.5–51.1)
LYMPHOCYTES # BLD AUTO: 19.3 % — LOW (ref 20.5–51.1)
MCHC RBC-ENTMCNC: 27.4 PG — SIGNIFICANT CHANGE UP (ref 27–31)
MCHC RBC-ENTMCNC: 27.4 PG — SIGNIFICANT CHANGE UP (ref 27–31)
MCHC RBC-ENTMCNC: 32.3 G/DL — SIGNIFICANT CHANGE UP (ref 32–37)
MCHC RBC-ENTMCNC: 32.3 G/DL — SIGNIFICANT CHANGE UP (ref 32–37)
MCV RBC AUTO: 84.8 FL — SIGNIFICANT CHANGE UP (ref 81–99)
MCV RBC AUTO: 84.8 FL — SIGNIFICANT CHANGE UP (ref 81–99)
MONOCYTES # BLD AUTO: 0.54 K/UL — SIGNIFICANT CHANGE UP (ref 0.1–0.6)
MONOCYTES # BLD AUTO: 0.54 K/UL — SIGNIFICANT CHANGE UP (ref 0.1–0.6)
MONOCYTES NFR BLD AUTO: 7 % — SIGNIFICANT CHANGE UP (ref 1.7–9.3)
MONOCYTES NFR BLD AUTO: 7 % — SIGNIFICANT CHANGE UP (ref 1.7–9.3)
NEUTROPHILS # BLD AUTO: 5.08 K/UL — SIGNIFICANT CHANGE UP (ref 1.4–6.5)
NEUTROPHILS # BLD AUTO: 5.08 K/UL — SIGNIFICANT CHANGE UP (ref 1.4–6.5)
NEUTROPHILS NFR BLD AUTO: 65.8 % — SIGNIFICANT CHANGE UP (ref 42.2–75.2)
NEUTROPHILS NFR BLD AUTO: 65.8 % — SIGNIFICANT CHANGE UP (ref 42.2–75.2)
NITRITE UR-MCNC: NEGATIVE — SIGNIFICANT CHANGE UP
NITRITE UR-MCNC: NEGATIVE — SIGNIFICANT CHANGE UP
NRBC # BLD: 0 /100 WBCS — SIGNIFICANT CHANGE UP (ref 0–0)
NRBC # BLD: 0 /100 WBCS — SIGNIFICANT CHANGE UP (ref 0–0)
PH UR: 6 — SIGNIFICANT CHANGE UP (ref 5–8)
PH UR: 6 — SIGNIFICANT CHANGE UP (ref 5–8)
PLATELET # BLD AUTO: 267 K/UL — SIGNIFICANT CHANGE UP (ref 130–400)
PLATELET # BLD AUTO: 267 K/UL — SIGNIFICANT CHANGE UP (ref 130–400)
PMV BLD: 8.8 FL — SIGNIFICANT CHANGE UP (ref 7.4–10.4)
PMV BLD: 8.8 FL — SIGNIFICANT CHANGE UP (ref 7.4–10.4)
POTASSIUM SERPL-MCNC: 4.9 MMOL/L — SIGNIFICANT CHANGE UP (ref 3.5–5)
POTASSIUM SERPL-MCNC: 4.9 MMOL/L — SIGNIFICANT CHANGE UP (ref 3.5–5)
POTASSIUM SERPL-SCNC: 4.9 MMOL/L — SIGNIFICANT CHANGE UP (ref 3.5–5)
POTASSIUM SERPL-SCNC: 4.9 MMOL/L — SIGNIFICANT CHANGE UP (ref 3.5–5)
PROT SERPL-MCNC: 6.5 G/DL — SIGNIFICANT CHANGE UP (ref 6–8)
PROT SERPL-MCNC: 6.5 G/DL — SIGNIFICANT CHANGE UP (ref 6–8)
PROT UR-MCNC: NEGATIVE MG/DL — SIGNIFICANT CHANGE UP
PROT UR-MCNC: NEGATIVE MG/DL — SIGNIFICANT CHANGE UP
RBC # BLD: 4.6 M/UL — SIGNIFICANT CHANGE UP (ref 4.2–5.4)
RBC # BLD: 4.6 M/UL — SIGNIFICANT CHANGE UP (ref 4.2–5.4)
RBC # FLD: 14.6 % — HIGH (ref 11.5–14.5)
RBC # FLD: 14.6 % — HIGH (ref 11.5–14.5)
RBC CASTS # UR COMP ASSIST: 0 /HPF — SIGNIFICANT CHANGE UP (ref 0–4)
RBC CASTS # UR COMP ASSIST: 0 /HPF — SIGNIFICANT CHANGE UP (ref 0–4)
SODIUM SERPL-SCNC: 139 MMOL/L — SIGNIFICANT CHANGE UP (ref 135–146)
SODIUM SERPL-SCNC: 139 MMOL/L — SIGNIFICANT CHANGE UP (ref 135–146)
SP GR SPEC: 1.01 — SIGNIFICANT CHANGE UP (ref 1–1.03)
SP GR SPEC: 1.01 — SIGNIFICANT CHANGE UP (ref 1–1.03)
SQUAMOUS # UR AUTO: 0 /HPF — SIGNIFICANT CHANGE UP (ref 0–5)
SQUAMOUS # UR AUTO: 0 /HPF — SIGNIFICANT CHANGE UP (ref 0–5)
UROBILINOGEN FLD QL: 0.2 MG/DL — SIGNIFICANT CHANGE UP (ref 0.2–1)
UROBILINOGEN FLD QL: 0.2 MG/DL — SIGNIFICANT CHANGE UP (ref 0.2–1)
WBC # BLD: 7.72 K/UL — SIGNIFICANT CHANGE UP (ref 4.8–10.8)
WBC # BLD: 7.72 K/UL — SIGNIFICANT CHANGE UP (ref 4.8–10.8)
WBC # FLD AUTO: 7.72 K/UL — SIGNIFICANT CHANGE UP (ref 4.8–10.8)
WBC # FLD AUTO: 7.72 K/UL — SIGNIFICANT CHANGE UP (ref 4.8–10.8)
WBC UR QL: 0 /HPF — SIGNIFICANT CHANGE UP (ref 0–5)
WBC UR QL: 0 /HPF — SIGNIFICANT CHANGE UP (ref 0–5)

## 2024-01-10 PROCEDURE — 36415 COLL VENOUS BLD VENIPUNCTURE: CPT

## 2024-01-10 PROCEDURE — 96374 THER/PROPH/DIAG INJ IV PUSH: CPT | Mod: XU

## 2024-01-10 PROCEDURE — 70487 CT MAXILLOFACIAL W/DYE: CPT | Mod: MA

## 2024-01-10 PROCEDURE — 70487 CT MAXILLOFACIAL W/DYE: CPT | Mod: 26,MA

## 2024-01-10 PROCEDURE — 80048 BASIC METABOLIC PNL TOTAL CA: CPT

## 2024-01-10 PROCEDURE — 96375 TX/PRO/DX INJ NEW DRUG ADDON: CPT | Mod: XU

## 2024-01-10 PROCEDURE — 85025 COMPLETE CBC W/AUTO DIFF WBC: CPT

## 2024-01-10 PROCEDURE — 81001 URINALYSIS AUTO W/SCOPE: CPT

## 2024-01-10 PROCEDURE — 74177 CT ABD & PELVIS W/CONTRAST: CPT | Mod: MA

## 2024-01-10 PROCEDURE — 71046 X-RAY EXAM CHEST 2 VIEWS: CPT | Mod: 26

## 2024-01-10 PROCEDURE — 83605 ASSAY OF LACTIC ACID: CPT

## 2024-01-10 PROCEDURE — 99284 EMERGENCY DEPT VISIT MOD MDM: CPT | Mod: 25

## 2024-01-10 PROCEDURE — 80076 HEPATIC FUNCTION PANEL: CPT

## 2024-01-10 PROCEDURE — 99285 EMERGENCY DEPT VISIT HI MDM: CPT

## 2024-01-10 PROCEDURE — 83690 ASSAY OF LIPASE: CPT

## 2024-01-10 PROCEDURE — 74177 CT ABD & PELVIS W/CONTRAST: CPT | Mod: 26,MA

## 2024-01-10 PROCEDURE — 71046 X-RAY EXAM CHEST 2 VIEWS: CPT

## 2024-01-10 RX ORDER — ONDANSETRON 8 MG/1
4 TABLET, FILM COATED ORAL ONCE
Refills: 0 | Status: COMPLETED | OUTPATIENT
Start: 2024-01-10 | End: 2024-01-10

## 2024-01-10 RX ORDER — MORPHINE SULFATE 50 MG/1
4 CAPSULE, EXTENDED RELEASE ORAL ONCE
Refills: 0 | Status: DISCONTINUED | OUTPATIENT
Start: 2024-01-10 | End: 2024-01-10

## 2024-01-10 RX ORDER — SODIUM CHLORIDE 9 MG/ML
1000 INJECTION INTRAMUSCULAR; INTRAVENOUS; SUBCUTANEOUS ONCE
Refills: 0 | Status: COMPLETED | OUTPATIENT
Start: 2024-01-10 | End: 2024-01-10

## 2024-01-10 RX ORDER — KETOROLAC TROMETHAMINE 30 MG/ML
15 SYRINGE (ML) INJECTION ONCE
Refills: 0 | Status: DISCONTINUED | OUTPATIENT
Start: 2024-01-10 | End: 2024-01-10

## 2024-01-10 RX ADMIN — Medication 15 MILLIGRAM(S): at 17:30

## 2024-01-10 RX ADMIN — SODIUM CHLORIDE 1000 MILLILITER(S): 9 INJECTION INTRAMUSCULAR; INTRAVENOUS; SUBCUTANEOUS at 12:03

## 2024-01-10 RX ADMIN — MORPHINE SULFATE 4 MILLIGRAM(S): 50 CAPSULE, EXTENDED RELEASE ORAL at 11:56

## 2024-01-10 RX ADMIN — ONDANSETRON 4 MILLIGRAM(S): 8 TABLET, FILM COATED ORAL at 11:56

## 2024-01-10 RX ADMIN — Medication 1 TABLET(S): at 17:30

## 2024-01-10 RX ADMIN — MORPHINE SULFATE 4 MILLIGRAM(S): 50 CAPSULE, EXTENDED RELEASE ORAL at 12:30

## 2024-01-10 NOTE — CONSULT NOTE ADULT - ASSESSMENT
Patient is a 68y old  Female who presents with a chief complaint of     HPI:      PAST MEDICAL & SURGICAL HISTORY:  No pertinent past medical history        (   ) heart valve replacement  (   ) joint replacement  (   ) pregnancy    MEDICATIONS  (STANDING):    MEDICATIONS  (PRN):      Allergies    No Known Allergies    Intolerances        FAMILY HISTORY:      *SOCIAL HISTORY: (   ) Tobacco; (   ) ETOH    *Last Dental Visit:    Vital Signs Last 24 Hrs  T(C): 36.9 (10 Pete 2024 10:08), Max: 36.9 (10 Pete 2024 10:08)  T(F): 98.5 (10 Pete 2024 10:08), Max: 98.5 (10 Pete 2024 10:08)  HR: 95 (10 Pete 2024 10:08) (95 - 95)  BP: 116/66 (10 Pete 2024 10:08) (116/66 - 116/66)  BP(mean): --  RR: 20 (10 Epte 2024 10:08) (20 - 20)  SpO2: 100% (10 Pete 2024 10:08) (100% - 100%)    Parameters below as of 10 Pete 2024 10:08  Patient On (Oxygen Delivery Method): room air        LABS:                        12.6   7.72  )-----------( 267      ( 10 Pete 2024 12:00 )             39.0     01-10    139  |  104  |  10  ----------------------------<  83  4.9   |  24  |  0.7    Ca    9.1      10 Pete 2024 12:00    TPro  6.5  /  Alb  4.3  /  TBili  0.4  /  DBili  <0.2  /  AST  15  /  ALT  17  /  AlkPhos  74  01-10    WBC Count: 7.72 K/uL [4.80 - 10.80] (01-10 @ 12:00)  Platelet Count - Automated: 267 K/uL [130 - 400] (01-10 @ 12:00)    Urinalysis Basic - ( 10 Pete 2024 12:00 )    Color: Yellow / Appearance: Clear / S.008 / pH: x  Gluc: 83 mg/dL / Ketone: Negative mg/dL  / Bili: Negative / Urobili: 0.2 mg/dL   Blood: x / Protein: Negative mg/dL / Nitrite: Negative   Leuk Esterase: Negative / RBC: 0 /HPF / WBC 0 /HPF   Sq Epi: x / Non Sq Epi: 0 /HPF / Bacteria: Negative /HPF          EOE:  TMJ ( -  ) clicks                     ( -  ) pops                     ( -  ) crepitus             Mandible <<FROM>>             Facial bones and MOM <<grossly intact>>             ( -  ) trismus             ( -  ) lymphadenopathy             ( +  ) swelling             ( +  ) asymmetry             ( +  ) palpation             ( -  ) dyspnea             ( -  ) dysphagia             ( -  ) loss of consciousness    IOE:  <<permanent>> dentition: <<grossly intact>>            hard/soft palate:  ( -  ) palatal torus, <<No pathology noted>>           tongue/FOM <<No pathology noted>>           labial/buccal mucosa <<fistula present apical to #3>>           ( +  ) percussion           ( +  ) palpation           ( +  ) swelling            ( -  ) abscess           (  + ) sinus tract    Dentition present: <<#2 and #3   >>  Mobility: <<None  >>  Caries: << Yes   >>         *DENTAL RADIOGRAPHS: Panoramic, Periapical     RADIOLOGY & ADDITIONAL STUDIES:    *ASSESSMENT: Limited exam performed reveals extraoral facial swelling on the right. No lymphadenopathy. 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 Radiograph reveals #2 and #3 with periapical radiolucencies associated with R sinus. Treatment risk and benefits explained as per OS sheet dated 00. Consent obtained. Side site verified.       *PLAN: Extraction #2 and #3    PROCEDURE:   Verbal and written consent given.  Anesthesia: << 2 carpules of 3% mepi and 2 carpules of 4% septo with 1:100,000 epi via infiltration    >>   Treatment: << Anesthetized, elevated and extracted #2 and #3. Curretted sockets and irrigated with saline. Post op radiograph taken. #15 Blade used in vestibule to excise any purulence and irrigated with saline. Had patient bite on gauze to achieve hemostasis. Post op instructions provided.   >>     RECOMMENDATIONS:  1) <<  Augmentin 875mg BID x 7 days. Ibuprofen 600mg PRN.   >>  2) Dental F/U with outpatient dentist for comprehensive dental care.   3) If any difficulty swallowing/breathing, fever occur, return to ER.     Resident Sujatha Gonzales, pager #[phone] Patient is a 68y old  Female who presents with a chief complaint of     HPI:      PAST MEDICAL & SURGICAL HISTORY:  No pertinent past medical history        (   ) heart valve replacement  (   ) joint replacement  (   ) pregnancy    MEDICATIONS  (STANDING):    MEDICATIONS  (PRN):      Allergies    No Known Allergies    Intolerances        FAMILY HISTORY:      *SOCIAL HISTORY: (   ) Tobacco; (   ) ETOH    *Last Dental Visit:    Vital Signs Last 24 Hrs  T(C): 36.9 (10 Pete 2024 10:08), Max: 36.9 (10 Pete 2024 10:08)  T(F): 98.5 (10 Pete 2024 10:08), Max: 98.5 (10 Pete 2024 10:08)  HR: 95 (10 Pete 2024 10:08) (95 - 95)  BP: 116/66 (10 Pete 2024 10:08) (116/66 - 116/66)  BP(mean): --  RR: 20 (10 Pete 2024 10:08) (20 - 20)  SpO2: 100% (10 Pete 2024 10:08) (100% - 100%)    Parameters below as of 10 Pete 2024 10:08  Patient On (Oxygen Delivery Method): room air        LABS:                        12.6   7.72  )-----------( 267      ( 10 Pete 2024 12:00 )             39.0     01-10    139  |  104  |  10  ----------------------------<  83  4.9   |  24  |  0.7    Ca    9.1      10 Pete 2024 12:00    TPro  6.5  /  Alb  4.3  /  TBili  0.4  /  DBili  <0.2  /  AST  15  /  ALT  17  /  AlkPhos  74  01-10    WBC Count: 7.72 K/uL [4.80 - 10.80] (01-10 @ 12:00)  Platelet Count - Automated: 267 K/uL [130 - 400] (01-10 @ 12:00)    Urinalysis Basic - ( 10 Pete 2024 12:00 )    Color: Yellow / Appearance: Clear / S.008 / pH: x  Gluc: 83 mg/dL / Ketone: Negative mg/dL  / Bili: Negative / Urobili: 0.2 mg/dL   Blood: x / Protein: Negative mg/dL / Nitrite: Negative   Leuk Esterase: Negative / RBC: 0 /HPF / WBC 0 /HPF   Sq Epi: x / Non Sq Epi: 0 /HPF / Bacteria: Negative /HPF          EOE:  TMJ ( -  ) clicks                     ( -  ) pops                     ( -  ) crepitus             Mandible <<FROM>>             Facial bones and MOM <<grossly intact>>             ( -  ) trismus             ( -  ) lymphadenopathy             ( +  ) swelling             ( +  ) asymmetry             ( +  ) palpation             ( -  ) dyspnea             ( -  ) dysphagia             ( -  ) loss of consciousness    IOE:  <<permanent>> dentition: <<grossly intact>>            hard/soft palate:  ( -  ) palatal torus, <<No pathology noted>>           tongue/FOM <<No pathology noted>>           labial/buccal mucosa <<fistula present apical to #3>>           ( +  ) percussion           ( +  ) palpation           ( +  ) swelling            ( -  ) abscess           (  + ) sinus tract    Dentition present: <<#2 and #3   >>  Mobility: <<None  >>  Caries: << Yes   >>         *DENTAL RADIOGRAPHS: Panoramic, Periapical     RADIOLOGY & ADDITIONAL STUDIES:    *ASSESSMENT: Limited exam performed reveals extraoral facial swelling on the right. No lymphadenopathy. 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 Radiograph reveals #2 and #3 with periapical radiolucencies associated with R sinus. Treatment risk and benefits explained as per OS sheet dated 00. Consent obtained. Side site verified.       *PLAN: Extraction #2 and #3    PROCEDURE:   Verbal and written consent given.  Anesthesia: << 2 carpules of 3% mepi and 2 carpules of 4% septo with 1:100,000 epi via infiltration    >>   Treatment: << Anesthetized, elevated and extracted #2 and #3. Curretted sockets and irrigated with saline. Post op radiograph taken. #15 Blade used in vestibule to excise any purulence and irrigated with saline. Had patient bite on gauze to achieve hemostasis. Post op instructions provided.   >>     RECOMMENDATIONS:  1) <<  Augmentin 875mg BID x 7 days. Ibuprofen 600mg PRN.   >>  2) Dental F/U with outpatient dentist for comprehensive dental care.   3) If any difficulty swallowing/breathing, fever occur, return to ER.     Resident Sujatha Gonzales, pager #[phone]

## 2024-01-10 NOTE — ED PROVIDER NOTE - PATIENT PORTAL LINK FT
You can access the FollowMyHealth Patient Portal offered by [facility] by registering at the following website: [email or web address]. By joining [employer]’s FollowMyHealth portal, you will also be able to view your health information using other applications (apps) compatible with our system. You can access the FollowMyHealth Patient Portal offered by [facility] by registering at the following website: [email or web address]. By joining [employer]’s FollowMyHealth portal, you will also be able to view your health information using other applications (apps) compatible with our system.

## 2024-01-10 NOTE — ED PROVIDER NOTE - PHYSICAL EXAMINATION
Vital Signs: I have reviewed the initial vital signs.  Constitutional: NAD, well-nourished, appears stated age, no acute distress.  HEENT: Airway patent, moist MM, no erythema/swelling/deformity of oral structures. EOMI, PERRLA.  CV: regular rate, regular rhythm, well-perfused extremities, 2+ b/l DP and radial pulses equal.  Lungs: BCTA, no increased WOB.  ABD: NT, ND, no guarding or rebound, no pulsatile mass, no hernias.   MSK: Neck supple, nontender, nl ROM, no stepoff. Chest nontender. L flank tenderness. Ext nontender, nl rom, no deformity.   INTEG: bruising abdomen/ pelvis.  NEURO: A&Ox3, normal strength, nl sensation throughout, normal speech.   PSYCH: Calm, cooperative, normal affect and interaction.

## 2024-01-10 NOTE — ED PROVIDER NOTE - CARE PLAN
Principal Discharge DX:	Dental abscess  Secondary Diagnosis:	Cough  Secondary Diagnosis:	Flank pain   1 Principal Discharge DX:	Dental abscess  Secondary Diagnosis:	Cough  Secondary Diagnosis:	Flank pain  Secondary Diagnosis:	Pancreatic lesion

## 2024-01-10 NOTE — ED PROVIDER NOTE - OBJECTIVE STATEMENT
68-year-old female no past medical history, smoker presents to the ED complaining of left flank pain radiating to the abdomen for 1 and half months.  Patient also complaining of productive cough for 1 and half months.  Patient also complaining of right upper facial swelling for a couple of days.  Patient denies any fever, chills, shortness of breath, chest pain or urinary symptoms.

## 2024-01-10 NOTE — ED PROVIDER NOTE - CLINICAL SUMMARY MEDICAL DECISION MAKING FREE TEXT BOX
Patient signed out to me from Dr. Mahajan -68-year-old female in ER with c/o L flank pain x 1 1/2 months, coough, and R facial swelling/abscess.   Labs reviewed: CBC/CMP unremarkable.  UA negative.  CXR negative.  CT abdomen: No acute pathology; 5 mm hypodensity noted within body of pancreas, nonemergent pancreatic MRI recommended.  Patient seen and evaluated by dental, teeth # 2 and # 3 extracted by them.  Recommend DC home with Augmentin x 7 days and follow-up with dental as outpatient.  Results of lab and imaging discussed with patient -patient told of incidental finding of pancreatic lesion and recommendation for outpatient MRI.  Patient given copy of labs and imaging report to take to her doctor.  Told to return to ER for increased pain, increased facial swelling, fever, difficulty swallowing or breathing, or any other new/concerning symptoms.  Patient understands and agrees with plan.

## 2024-01-10 NOTE — ED PROVIDER NOTE - NSFOLLOWUPINSTRUCTIONS_ED_ALL_ED_FT
Dental Abscess    WHAT YOU NEED TO KNOW:    A dental abscess is a collection of pus in or around a tooth. A dental abscess is caused by bacteria. The bacteria usually enter the tooth when the enamel (outer part of the tooth) is damaged by tooth decay. Bacteria may also enter the tooth through a break or chip in the tooth, or a cut in the gum. Food particles that are stuck between the teeth for a long time may also lead to an abscess.          DISCHARGE INSTRUCTIONS:    Return to the emergency department if:     You have severe pain.      You have trouble breathing because of pain or swelling.    Contact your healthcare provider if:     Your symptoms get worse, even after treatment.      Your mouth is bleeding.      You cannot eat or drink because of pain or swelling.      Your abscess returns.      You have an injury that causes a crack in your tooth.      You have questions or concerns about your condition or care.    Medicines: You may need any of the following:     Antibiotics help treat a bacterial infection.       NSAIDs, such as ibuprofen, help decrease swelling, pain, and fever. This medicine is available with or without a doctor's order. NSAIDs can cause stomach bleeding or kidney problems in certain people. If you take blood thinner medicine, always ask your healthcare provider if NSAIDs are safe for you. Always read the medicine label and follow directions.      Acetaminophen decreases pain and fever. It is available without a doctor's order. Ask how much to take and how often to take it. Follow directions. Read the labels of all other medicines you are using to see if they also contain acetaminophen, or ask your doctor or pharmacist. Acetaminophen can cause liver damage if not taken correctly. Do not use more than 4 grams (4,000 milligrams) total of acetaminophen in one day.       Prescription pain medicine may be given. Ask your healthcare provider how to take this medicine safely. Some prescription pain medicines contain acetaminophen. Do not take other medicines that contain acetaminophen without talking to your healthcare provider. Too much acetaminophen may cause liver damage. Prescription pain medicine may cause constipation. Ask your healthcare provider how to prevent or treat constipation.       Take your medicine as directed. Contact your healthcare provider if you think your medicine is not helping or if you have side effects. Tell him of her if you are allergic to any medicine. Keep a list of the medicines, vitamins, and herbs you take. Include the amounts, and when and why you take them. Bring the list or the pill bottles to follow-up visits. Carry your medicine list with you in case of an emergency.    Self-care:     Rinse your mouth every 2 hours with salt water. This will help keep the area clean.       Gently brush your teeth twice a day with a soft tooth brush. This will help keep the area clean.       Eat soft foods as directed. Soft foods may cause less pain. Examples include applesauce, yogurt, and cooked pasta. Ask your healthcare provider how long to follow this instruction.       Apply a warm compress to your tooth or gum. Use a cotton ball or gauze soaked in warm water. Remove the compress in 10 minutes or when it becomes cool. Repeat 3 times a day.     Prevent another abscess:     Brush your teeth at least 2 times a day with fluoride toothpaste.      Use dental floss to clean between your teeth at least once a day.      Rinse your mouth with water or mouthwash after meals and snacks.       Chew sugarless gum after meals and snacks.      Limit foods that are sticky and high in sugar such as raisons. Also limit drinks high in sugar, such as soda.       See your dentist every 6 months for dental cleanings and oral exams.    Follow up with your healthcare provider in 24 hours: Your healthcare provider will need to check your teeth and gums. Write down your questions so you remember to ask them during your visits.        Cough    Coughing is a reflex that clears your throat and your airways. Coughing helps to heal and protect your lungs. It is normal to cough occasionally, but a cough that happens with other symptoms or lasts a long time may be a sign of a condition that needs treatment. Coughing may be caused by infections, asthma or COPD, smoking, postnasal drip, gastroesophageal reflux, as well as other medical conditions. Take medicines only as instructed by your health care provider. Avoid environments or triggers that causes you to cough at work or at home.    SEEK IMMEDIATE MEDICAL CARE IF YOU HAVE ANY OF THE FOLLOWING SYMPTOMS: coughing up blood, shortness of breath, rapid heart rate, chest pain, unexplained weight loss or night sweats.    Back Pain    Back pain is very common in adults. The cause of back pain is rarely dangerous and the pain often gets better over time. The cause of your back pain may not be known and may include strain of muscles or ligaments, degeneration of the spinal disks, or arthritis. Occasionally the pain may radiate down your leg(s). Over-the-counter medicines to reduce pain and inflammation are often the most helpful. Stretching and remaining active frequently helps the healing process.     SEEK IMMEDIATE MEDICAL CARE IF YOU HAVE ANY OF THE FOLLOWING SYMPTOMS: bowel or bladder control problems, unusual weakness or numbness in your arms or legs, nausea or vomiting, abdominal pain, fever, dizziness/lightheadedness.

## 2024-01-10 NOTE — ED PROVIDER NOTE - ATTENDING APP SHARED VISIT CONTRIBUTION OF CARE
68-year-old female no significant past medical history presenting for evaluation of multiple complaints.  States that she has had worsening right facial swelling for the past few days.  No fevers chills or drainage.  Also complaining of left flank pain getting progressively worse over the last month.  No urinary symptoms.  History of , no other abdominal surgeries.  Also reports recent hydrotherapy.  Uncomfortable appearing, non toxic. NCAT PERRLA EOMI right facial swelling, airway intact, no drooling, no stridor, no pooling of secretions, no posterior oropharyngeal erythema/edema/lesions. Speaking in full sentences. +tolerating secretions, tolerating PO, right intraoral abscess neck supple non tender normal wob cta bl rrr abdomen s nt nd no rebound no guarding [id number] neuro non focal left CVA tenderness, extensive bruising noted to lower abdomen/groin.

## 2024-01-10 NOTE — ED ADULT NURSE NOTE - NSFALLUNIVINTERV_ED_ALL_ED
Bed/Stretcher in lowest position, wheels locked, appropriate side rails in place/Call bell, personal items and telephone in reach/Instruct patient to call for assistance before getting out of bed/chair/stretcher/Non-slip footwear applied when patient is off stretcher/[place] to call system/Physically safe environment - no spills, clutter or unnecessary equipment/Purposeful proactive rounding/Room/bathroom lighting operational, light cord in reach Bed/Stretcher in lowest position, wheels locked, appropriate side rails in place/Call bell, personal items and telephone in reach/Instruct patient to call for assistance before getting out of bed/chair/stretcher/Non-slip footwear applied when patient is off stretcher/[place] to call system/Physically safe environment - no spills, clutter or unnecessary equipment/Purposeful proactive rounding/Room/bathroom lighting operational, light cord in reach

## 2024-01-10 NOTE — ED PROVIDER NOTE - PROGRESS NOTE DETAILS
Discussed with dental, patient at dental clinic currently for evaluation. Patient had 2 teeth extracted in dental clinic. Plan dc home with Augmentin. Patient had 2 teeth extracted in dental clinic. Plan dc home with Augmentin.  CT results showing hypodensity on pancreas discussed with patient.